# Patient Record
Sex: FEMALE | Race: WHITE | NOT HISPANIC OR LATINO
[De-identification: names, ages, dates, MRNs, and addresses within clinical notes are randomized per-mention and may not be internally consistent; named-entity substitution may affect disease eponyms.]

---

## 2018-12-26 ENCOUNTER — TRANSCRIPTION ENCOUNTER (OUTPATIENT)
Age: 23
End: 2018-12-26

## 2019-06-19 ENCOUNTER — RESULT REVIEW (OUTPATIENT)
Age: 24
End: 2019-06-19

## 2021-08-19 ENCOUNTER — RESULT REVIEW (OUTPATIENT)
Age: 26
End: 2021-08-19

## 2022-09-20 ENCOUNTER — NON-APPOINTMENT (OUTPATIENT)
Age: 27
End: 2022-09-20

## 2022-09-21 ENCOUNTER — EMERGENCY (EMERGENCY)
Facility: HOSPITAL | Age: 27
LOS: 0 days | Discharge: HOME | End: 2022-09-21
Attending: EMERGENCY MEDICINE | Admitting: EMERGENCY MEDICINE

## 2022-09-21 VITALS
DIASTOLIC BLOOD PRESSURE: 55 MMHG | HEART RATE: 77 BPM | TEMPERATURE: 98 F | SYSTOLIC BLOOD PRESSURE: 98 MMHG | HEIGHT: 60 IN | WEIGHT: 117.95 LBS | OXYGEN SATURATION: 99 % | RESPIRATION RATE: 18 BRPM

## 2022-09-21 VITALS — DIASTOLIC BLOOD PRESSURE: 72 MMHG | HEART RATE: 84 BPM | SYSTOLIC BLOOD PRESSURE: 110 MMHG

## 2022-09-21 DIAGNOSIS — Z98.89 OTHER SPECIFIED POSTPROCEDURAL STATES: Chronic | ICD-10-CM

## 2022-09-21 DIAGNOSIS — R19.7 DIARRHEA, UNSPECIFIED: ICD-10-CM

## 2022-09-21 DIAGNOSIS — R10.30 LOWER ABDOMINAL PAIN, UNSPECIFIED: ICD-10-CM

## 2022-09-21 DIAGNOSIS — Z90.89 ACQUIRED ABSENCE OF OTHER ORGANS: ICD-10-CM

## 2022-09-21 LAB
ALBUMIN SERPL ELPH-MCNC: 4.6 G/DL — SIGNIFICANT CHANGE UP (ref 3.5–5.2)
ALP SERPL-CCNC: 71 U/L — SIGNIFICANT CHANGE UP (ref 30–115)
ALT FLD-CCNC: 10 U/L — SIGNIFICANT CHANGE UP (ref 0–41)
ANION GAP SERPL CALC-SCNC: 8 MMOL/L — SIGNIFICANT CHANGE UP (ref 7–14)
APPEARANCE UR: CLEAR — SIGNIFICANT CHANGE UP
AST SERPL-CCNC: 13 U/L — SIGNIFICANT CHANGE UP (ref 0–41)
BACTERIA # UR AUTO: ABNORMAL
BASOPHILS # BLD AUTO: 0.04 K/UL — SIGNIFICANT CHANGE UP (ref 0–0.2)
BASOPHILS NFR BLD AUTO: 0.5 % — SIGNIFICANT CHANGE UP (ref 0–1)
BILIRUB DIRECT SERPL-MCNC: <0.2 MG/DL — SIGNIFICANT CHANGE UP (ref 0–0.3)
BILIRUB INDIRECT FLD-MCNC: >0 MG/DL — LOW (ref 0.2–1.2)
BILIRUB SERPL-MCNC: 0.2 MG/DL — SIGNIFICANT CHANGE UP (ref 0.2–1.2)
BILIRUB UR-MCNC: NEGATIVE — SIGNIFICANT CHANGE UP
BUN SERPL-MCNC: 13 MG/DL — SIGNIFICANT CHANGE UP (ref 10–20)
CALCIUM SERPL-MCNC: 9.3 MG/DL — SIGNIFICANT CHANGE UP (ref 8.4–10.5)
CHLORIDE SERPL-SCNC: 104 MMOL/L — SIGNIFICANT CHANGE UP (ref 98–110)
CO2 SERPL-SCNC: 28 MMOL/L — SIGNIFICANT CHANGE UP (ref 17–32)
COLOR SPEC: YELLOW — SIGNIFICANT CHANGE UP
CREAT SERPL-MCNC: 0.8 MG/DL — SIGNIFICANT CHANGE UP (ref 0.7–1.5)
DIFF PNL FLD: ABNORMAL
EGFR: 104 ML/MIN/1.73M2 — SIGNIFICANT CHANGE UP
EOSINOPHIL # BLD AUTO: 0.15 K/UL — SIGNIFICANT CHANGE UP (ref 0–0.7)
EOSINOPHIL NFR BLD AUTO: 2 % — SIGNIFICANT CHANGE UP (ref 0–8)
EPI CELLS # UR: ABNORMAL /HPF
GLUCOSE SERPL-MCNC: 82 MG/DL — SIGNIFICANT CHANGE UP (ref 70–99)
GLUCOSE UR QL: NEGATIVE MG/DL — SIGNIFICANT CHANGE UP
HCG SERPL QL: NEGATIVE — SIGNIFICANT CHANGE UP
HCT VFR BLD CALC: 35.6 % — LOW (ref 37–47)
HGB BLD-MCNC: 12.2 G/DL — SIGNIFICANT CHANGE UP (ref 12–16)
IMM GRANULOCYTES NFR BLD AUTO: 0.1 % — SIGNIFICANT CHANGE UP (ref 0.1–0.3)
KETONES UR-MCNC: NEGATIVE — SIGNIFICANT CHANGE UP
LACTATE SERPL-SCNC: 0.7 MMOL/L — SIGNIFICANT CHANGE UP (ref 0.7–2)
LEUKOCYTE ESTERASE UR-ACNC: NEGATIVE — SIGNIFICANT CHANGE UP
LIDOCAIN IGE QN: 27 U/L — SIGNIFICANT CHANGE UP (ref 7–60)
LYMPHOCYTES # BLD AUTO: 3.43 K/UL — HIGH (ref 1.2–3.4)
LYMPHOCYTES # BLD AUTO: 45.4 % — SIGNIFICANT CHANGE UP (ref 20.5–51.1)
MCHC RBC-ENTMCNC: 31 PG — SIGNIFICANT CHANGE UP (ref 27–31)
MCHC RBC-ENTMCNC: 34.3 G/DL — SIGNIFICANT CHANGE UP (ref 32–37)
MCV RBC AUTO: 90.4 FL — SIGNIFICANT CHANGE UP (ref 81–99)
MONOCYTES # BLD AUTO: 0.77 K/UL — HIGH (ref 0.1–0.6)
MONOCYTES NFR BLD AUTO: 10.2 % — HIGH (ref 1.7–9.3)
NEUTROPHILS # BLD AUTO: 3.15 K/UL — SIGNIFICANT CHANGE UP (ref 1.4–6.5)
NEUTROPHILS NFR BLD AUTO: 41.8 % — LOW (ref 42.2–75.2)
NITRITE UR-MCNC: NEGATIVE — SIGNIFICANT CHANGE UP
NRBC # BLD: 0 /100 WBCS — SIGNIFICANT CHANGE UP (ref 0–0)
PH UR: 7 — SIGNIFICANT CHANGE UP (ref 5–8)
PLATELET # BLD AUTO: 267 K/UL — SIGNIFICANT CHANGE UP (ref 130–400)
POTASSIUM SERPL-MCNC: 4.1 MMOL/L — SIGNIFICANT CHANGE UP (ref 3.5–5)
POTASSIUM SERPL-SCNC: 4.1 MMOL/L — SIGNIFICANT CHANGE UP (ref 3.5–5)
PROT SERPL-MCNC: 6.8 G/DL — SIGNIFICANT CHANGE UP (ref 6–8)
PROT UR-MCNC: NEGATIVE MG/DL — SIGNIFICANT CHANGE UP
RBC # BLD: 3.94 M/UL — LOW (ref 4.2–5.4)
RBC # FLD: 12 % — SIGNIFICANT CHANGE UP (ref 11.5–14.5)
RBC CASTS # UR COMP ASSIST: ABNORMAL /HPF
SODIUM SERPL-SCNC: 140 MMOL/L — SIGNIFICANT CHANGE UP (ref 135–146)
SP GR SPEC: 1.01 — SIGNIFICANT CHANGE UP (ref 1.01–1.03)
UROBILINOGEN FLD QL: 0.2 MG/DL — SIGNIFICANT CHANGE UP
WBC # BLD: 7.55 K/UL — SIGNIFICANT CHANGE UP (ref 4.8–10.8)
WBC # FLD AUTO: 7.55 K/UL — SIGNIFICANT CHANGE UP (ref 4.8–10.8)
WBC UR QL: SIGNIFICANT CHANGE UP /HPF

## 2022-09-21 PROCEDURE — 99285 EMERGENCY DEPT VISIT HI MDM: CPT

## 2022-09-21 PROCEDURE — 93010 ELECTROCARDIOGRAM REPORT: CPT

## 2022-09-21 PROCEDURE — G1004: CPT

## 2022-09-21 PROCEDURE — 74177 CT ABD & PELVIS W/CONTRAST: CPT | Mod: 26,MG

## 2022-09-21 RX ORDER — SODIUM CHLORIDE 9 MG/ML
1000 INJECTION INTRAMUSCULAR; INTRAVENOUS; SUBCUTANEOUS ONCE
Refills: 0 | Status: COMPLETED | OUTPATIENT
Start: 2022-09-21 | End: 2022-09-21

## 2022-09-21 RX ADMIN — SODIUM CHLORIDE 1000 MILLILITER(S): 9 INJECTION INTRAMUSCULAR; INTRAVENOUS; SUBCUTANEOUS at 16:44

## 2022-09-21 NOTE — ED PROVIDER NOTE - PHYSICAL EXAMINATION
VITAL SIGNS: I have reviewed nursing notes and confirm.  CONSTITUTIONAL: Well-developed; well-nourished; in no acute distress.   SKIN: skin exam is warm and dry, no acute rash.    HEAD: Normocephalic; atraumatic.  EYES: conjunctiva and sclera clear.  ENT: No nasal discharge; airway clear.  NECK: Supple; non tender.  CARD: S1, S2 normal; no murmurs, gallops, or rubs. Regular rate and rhythm.   RESP: No wheezes, rales or rhonchi.  ABD: TTp lower quadrants  Normal bowel sounds; soft; non-distended  EXT: Normal ROM.  No clubbing, cyanosis or edema.   NEURO: Alert, oriented, grossly unremarkable

## 2022-09-21 NOTE — ED PROVIDER NOTE - ATTENDING APP SHARED VISIT CONTRIBUTION OF CARE
27-year-old female presents for evaluation of diarrhea over the last 4 days.  Patient reports lower abdominal cramping and discomfort as well.  Patient with recent trip to evaluate she needed to cut short secondary to symptoms.  No nausea or vomiting.  No one else sick in her party.  No fever or chills, on exam patient in NAD, AAOx3, MMM, abdomen is soft, nondistended, mild tenderness to lower abdomen, no rash

## 2022-09-21 NOTE — ED PROVIDER NOTE - NS ED ROS FT
Eyes:  No visual changes, eye pain or discharge.  ENMT:  No hearing changes, pain, discharge or infections. No neck pain or stiffness.  Cardiac:  No chest pain, SOB or edema. No chest pain with exertion.  Respiratory:  No cough or respiratory distress. No hemoptysis. No history of asthma or RAD.  GI:  + abd pain + diarrhea No nausea, vomiting  :  No dysuria, frequency or burning.  MS:  No myalgia, muscle weakness, joint pain or back pain.  Neuro:  No headache or weakness.  No LOC.  Skin:  No skin rash.   Endocrine: No history of thyroid disease or diabetes.  Except as documented in the HPI,  all other systems are negative.

## 2022-09-21 NOTE — ED PROVIDER NOTE - PATIENT PORTAL LINK FT
You can access the FollowMyHealth Patient Portal offered by [facility] by registering at the following website: [email or web address]. By joining [facility]’s FollowMyHealth portal, you will also be able to view your health information using other applications (apps) compatible with our system.

## 2022-09-21 NOTE — ED ADULT TRIAGE NOTE - CHIEF COMPLAINT QUOTE
"I just got back from [place] yesterday, while I was there I had diarrhea for four days. I went to urgent care today, and they told me to come in to rule out colitis" Denies vomitting and fevers.

## 2022-09-21 NOTE — ED PROVIDER NOTE - CLINICAL SUMMARY MEDICAL DECISION MAKING FREE TEXT BOX
Labs and imaging obtained.  Results reviewed and discussed with patient and significant other.  Patient made aware of incidental findings.  Patient aware of findings from past.  Patient to follow-up with PMD.  Patient to follow bland diet continue p.o. hydration.

## 2022-09-21 NOTE — ED ADULT NURSE NOTE - NSSUHOSCREENINGYN_ED_ALL_ED
History of Present Illness


General


Chief Complaint:  Oral/Throat Problems


Stated Complaint:  LIP SWELLING/POSSIBLE INFECTION/CHEWED ON LIP


Nursing Triage Note:  


C/O SWELLING TO LEFT LOWER LIP SINCE 1800 22 AFTER BITING LIP WHEN NERVOUS.


Source:  patient


Exam Limitations:  no limitations





History of Present Illness


Date Seen by Provider:  2022


Time Seen by Provider:  02:52


Initial Comments


19 yo female presents to the ER with a complaint of swollen left lower lip.  She

states she has been agitated recently and "biting" in the side of her lower lip 

on the left. It has progressively gotten more and more swollen.  She has 

anxiety. does not take anything for it.  No other complaints of illness or 

infection - she was concerned that her lip is infected.  No bleeding.  SHe 

states she has put ice packs on it.





She does appear agitated with some pressured speech - similar to possible 

methamphetamine intoxication.


Timing/Duration:  gradual


Severity:  moderate


Location:  mouth


Prearrival Treatment:  over the counter meds ("pain reliever")


Associated Symptoms:  denies symptoms





Allergies and Home Medications


Allergies


Coded Allergies:  


     sulfamethoxazole (Verified  Allergy, Unknown, 07)


     trimethoprim (Verified  Allergy, Unknown, 07)





Patient Home Medication List


Home Medication List Reviewed:  Yes


Darunavir/Cob/Emtri/Tenof Alaf (Symtuza [phone] mg Tab) 800 Mg-150 Mg-200

Mg-10 Mg Tablet, (Reported)


   Entered as Reported by: RUDY CALZADA on 22


   Last Action: New Order


Dolutegravir Sodium/Lamivudine (Dovato  mg Tablet) 50 Mg-300 Mg Tablet, 

(Reported)


   Entered as Reported by: RUDY CALZADA on 22


   Last Action: New Order


Sertraline HCl (Sertraline HCl) 50 Mg Tablet, (Reported)


   Entered as Reported by: RUDY CALZADA on 22


   Last Action: New Order


Discontinued Medications


Docusate Sodium (Colace) 100 Mg Capsule, 100 MG PO BID


   Discontinued Reason: No Longer Taking


   Prescribed by: SAL MARTINEZ on 19


   Last Action: Discontinued


Ferrous Sulfate (Ferrous Sulfate) 325 Mg Tablet, 325 MG PO DAILY


   Discontinued Reason: No Longer Taking


   Prescribed by: SAL MARTINEZ on 19


   Last Action: Discontinued


Ibuprofen (Ibu) 600 Mg Tablet, 600 MG PO Q6H


   Discontinued Reason: No Longer Taking


   Prescribed by: SAL MARTINEZ on 19


   Last Action: Discontinued


Pnv #14/Ferrous Fum/Folic Acid (Completenate Tablet Chew) 1 Each Tab.chew, 1 

EACH PO DAILY


   Discontinued Reason: No Longer Taking


   Prescribed by: SAL MARTINEZ on 19


   Last Action: Discontinued





Review of Systems


Review of Systems


Constitutional:  see HPI


Eyes:  No Symptoms Reported


Ears:  No Symptoms Reported


Nose:  see HPI


Mouth:  other (swelling left lower lip)


Throat:  no symptoms reported


Respiratory:  no symptoms reported


Cardiovascular:  no symptoms reported


Gastrointestinal:  no symptoms reported


Neurological:  Anxiety





All Other Systems Reviewed


Negative Unless Noted:  Yes





Past Medical-Social-Family Hx


Patient Social History


Tobacco Use?:  No


Substance use?:  No


Alcohol Use?:  No


Pt feels they are or have been:  No





Immunizations Up To Date


PED Vaccines UTD:  Yes





Seasonal Allergies


Seasonal Allergies:  No





Past Medical History


Surgery/Hospitalization HX:  


HIV, ANXIETY/DEPRESSION


Surgeries:  No


Respiratory:  No


Cardiac:  No


Neurological:  No


Reproductive Disorders:  No


Female Reproductive Disorders:  Denies


Genitourinary:  No


Gastrointestinal:  No


Musculoskeletal:  No


Endocrine:  No


HEENT:  No


Cancer:  No


Psychosocial:  No


Integumentary:  No


Blood Disorders:  No


Adverse Reaction/Blood Tranf:  No





Family Medical History





Cardiovascular disease


  19 FATHER (father  from cardiovascular disease. Pt does not know the 

specifics, but will ask her mom in am.)





Physical Exam


Vital Signs





Vital Signs - First Documented








 22





 02:16


 


Temp 36.8


 


Pulse 120


 


Resp 16


 


B/P (MAP) 160/111 (127)


 


Pulse Ox 98


 


O2 Delivery Room Air








Height, Weight, BMI


Height: 5'9.00"


Weight: 210lbs. 2.0oz. 95.142979bk; 20.00 BMI


Method:Stated


General Appearance:  WD/WN, mild distress (anxious)


Eyes:  bilateral eye normal inspection, bilateral eye PERRL, bilateral eye EOMI


Ears:  bilateral ear auricle normal


Nose:  normal inspection


Mouth/Throat:  pharynx normal; No dental tenderness, No excessive drooling; 

other (edema left lower lip - she has avulsed the inner mucosa off of her lip. 

no open wounds; no bleeding. no erythema surrounding the swelling; tender to 

palpation)


Neck:  supple, normal inspection


Cardiovascular:  regular rate, rhythm


Respiratory:  lungs clear, normal breath sounds, no respiratory distress


Neurologic/Psychiatric:  alert, oriented x 3, other (anxious, pressured speech)


Skin:  normal color, warm/dry





Progress/Results/Core Measures


Results/Orders


Vital Signs/I&O











Blood Pressure Mean:                    127











Departure


Impression





   Primary Impression:  


   Lip injury


   Qualified Codes:  S09.93XA - Unspecified injury of face, initial encounter


Disposition:   HOME, SELF-CARE


Condition:  Stable





Departure-Patient Inst.


Decision time for Depature:  03:04


Referrals:  


[facility]


Patient Instructions:  Mouth and Dental Injuries in Adults





Add. Discharge Instructions:  


Continue to use ice packs to the swollen area of your lip for at least 20 

minutes at a time 4-5 times a day.





Try to stop biting on your lip.





Ibuprofen 600 mg which is 3 tablets of either Advil or Motrin every 6 hours with

food for swelling and discomfort.





Follow-up with your primary care doctor.





Return to the emergency department for any new, concerning or emergent 

complaints.





[facility]


[phone]


[address] [place] [address]


Get Immediate Help 


MentalHealth.gov





or Call [phone] -











GILMA DIEGO MD         2022 03:05 Yes - the patient is able to be screened

## 2022-09-21 NOTE — ED ADULT NURSE NOTE - NSICDXPASTSURGICALHX_GEN_ALL_CORE_FT
Pt last seen 9/5/19 with Dr. SOLEDAD Schuler  No fuv    Last RX  Jay Schuler MD   Outpatient Medication Detail      Disp Refills Start End    tiZANidine (ZANAFLEX) 4 MG tablet (Discontinued) 60 tablet 0 7/30/2019 9/19/2019    Sig - Route: Take 1 tablet by mouth every 6 hours as needed (prn). - Oral    Sent to pharmacy as: tiZANidine HCl 4 MG Oral Tablet    Class: Eprescribe    Reason for Discontinue: Therapy Completed         PAST SURGICAL HISTORY:  S/P tonsillectomy

## 2022-09-21 NOTE — ED ADULT NURSE NOTE - CHIEF COMPLAINT QUOTE
"I just got back from [place] yesterday, while I was there I had diarrhea for four days. I went to urgent care today, and they told me to come in to rule out colitis" Denies vomiting and fevers.

## 2022-10-27 ENCOUNTER — RESULT REVIEW (OUTPATIENT)
Age: 27
End: 2022-10-27

## 2023-03-31 ENCOUNTER — ASOB RESULT (OUTPATIENT)
Age: 28
End: 2023-03-31

## 2023-03-31 ENCOUNTER — APPOINTMENT (OUTPATIENT)
Dept: ANTEPARTUM | Facility: CLINIC | Age: 28
End: 2023-03-31
Payer: COMMERCIAL

## 2023-03-31 PROCEDURE — 99202 OFFICE O/P NEW SF 15 MIN: CPT | Mod: 25

## 2023-03-31 PROCEDURE — 76801 OB US < 14 WKS SINGLE FETUS: CPT

## 2023-04-12 ENCOUNTER — APPOINTMENT (OUTPATIENT)
Dept: OBGYN | Facility: CLINIC | Age: 28
End: 2023-04-12
Payer: COMMERCIAL

## 2023-04-12 ENCOUNTER — NON-APPOINTMENT (OUTPATIENT)
Age: 28
End: 2023-04-12

## 2023-04-12 VITALS
DIASTOLIC BLOOD PRESSURE: 72 MMHG | SYSTOLIC BLOOD PRESSURE: 107 MMHG | WEIGHT: 120 LBS | BODY MASS INDEX: 23.56 KG/M2 | HEIGHT: 60 IN

## 2023-04-12 DIAGNOSIS — N92.0 EXCESSIVE AND FREQUENT MENSTRUATION WITH REGULAR CYCLE: ICD-10-CM

## 2023-04-12 DIAGNOSIS — Z31.430 ENCOUNTER OF FEMALE FOR TESTING FOR GENETIC DISEASE CARRIER STATUS FOR PROCREATIVE MANAGEMENT: ICD-10-CM

## 2023-04-12 PROCEDURE — 0500F INITIAL PRENATAL CARE VISIT: CPT

## 2023-04-12 PROCEDURE — 76817 TRANSVAGINAL US OBSTETRIC: CPT

## 2023-04-12 PROCEDURE — 36415 COLL VENOUS BLD VENIPUNCTURE: CPT

## 2023-04-13 LAB
C TRACH RRNA SPEC QL NAA+PROBE: NOT DETECTED
ESTIMATED AVERAGE GLUCOSE: 103 MG/DL
HBA1C MFR BLD HPLC: 5.2 %
N GONORRHOEA RRNA SPEC QL NAA+PROBE: NOT DETECTED
SOURCE AMPLIFICATION: NORMAL

## 2023-04-14 LAB — LEAD BLD-MCNC: <1 UG/DL

## 2023-04-16 LAB
25(OH)D3 SERPL-MCNC: 23.2 NG/ML
MEV IGG FLD QL IA: 15.2 AU/ML
MEV IGG+IGM SER-IMP: NORMAL
MUV AB SER-ACNC: POSITIVE
MUV IGG SER QL IA: 149 AU/ML
RUBV IGG FLD-ACNC: 5.1 INDEX
RUBV IGG SER-IMP: POSITIVE

## 2023-04-16 RX ORDER — AMPICILLIN 500 MG/1
500 CAPSULE ORAL TWICE DAILY
Qty: 14 | Refills: 1 | Status: ACTIVE | COMMUNITY
Start: 2023-04-16 | End: 1900-01-01

## 2023-04-16 RX ORDER — AMPICILLIN 500 MG/1
500 CAPSULE ORAL
Qty: 14 | Refills: 0 | Status: ACTIVE | COMMUNITY
Start: 2023-04-16 | End: 1900-01-01

## 2023-04-18 ENCOUNTER — NON-APPOINTMENT (OUTPATIENT)
Age: 28
End: 2023-04-18

## 2023-04-18 ENCOUNTER — APPOINTMENT (OUTPATIENT)
Dept: OBGYN | Facility: CLINIC | Age: 28
End: 2023-04-18
Payer: COMMERCIAL

## 2023-04-18 VITALS
DIASTOLIC BLOOD PRESSURE: 64 MMHG | SYSTOLIC BLOOD PRESSURE: 100 MMHG | WEIGHT: 122 LBS | BODY MASS INDEX: 23.95 KG/M2 | HEIGHT: 60 IN

## 2023-04-18 PROCEDURE — 0502F SUBSEQUENT PRENATAL CARE: CPT

## 2023-04-21 ENCOUNTER — NON-APPOINTMENT (OUTPATIENT)
Age: 28
End: 2023-04-21

## 2023-04-24 ENCOUNTER — NON-APPOINTMENT (OUTPATIENT)
Age: 28
End: 2023-04-24

## 2023-04-25 ENCOUNTER — NON-APPOINTMENT (OUTPATIENT)
Age: 28
End: 2023-04-25

## 2023-04-26 ENCOUNTER — APPOINTMENT (OUTPATIENT)
Dept: ANTEPARTUM | Facility: CLINIC | Age: 28
End: 2023-04-26

## 2023-04-26 ENCOUNTER — APPOINTMENT (OUTPATIENT)
Dept: OBGYN | Facility: CLINIC | Age: 28
End: 2023-04-26

## 2023-05-01 ENCOUNTER — NON-APPOINTMENT (OUTPATIENT)
Age: 28
End: 2023-05-01

## 2023-05-02 ENCOUNTER — APPOINTMENT (OUTPATIENT)
Dept: OBGYN | Facility: CLINIC | Age: 28
End: 2023-05-02
Payer: COMMERCIAL

## 2023-05-02 ENCOUNTER — TRANSCRIPTION ENCOUNTER (OUTPATIENT)
Age: 28
End: 2023-05-02

## 2023-05-02 ENCOUNTER — ASOB RESULT (OUTPATIENT)
Age: 28
End: 2023-05-02

## 2023-05-02 PROCEDURE — 0502F SUBSEQUENT PRENATAL CARE: CPT

## 2023-05-02 PROCEDURE — 76813 OB US NUCHAL MEAS 1 GEST: CPT

## 2023-05-02 PROCEDURE — 76801 OB US < 14 WKS SINGLE FETUS: CPT

## 2023-05-02 PROCEDURE — 36415 COLL VENOUS BLD VENIPUNCTURE: CPT

## 2023-05-04 LAB — BACTERIA UR CULT: NORMAL

## 2023-05-08 ENCOUNTER — TRANSCRIPTION ENCOUNTER (OUTPATIENT)
Age: 28
End: 2023-05-08

## 2023-05-09 ENCOUNTER — NON-APPOINTMENT (OUTPATIENT)
Age: 28
End: 2023-05-09

## 2023-05-18 ENCOUNTER — APPOINTMENT (OUTPATIENT)
Dept: OBGYN | Facility: CLINIC | Age: 28
End: 2023-05-18
Payer: COMMERCIAL

## 2023-05-18 DIAGNOSIS — R10.30 OTHER SPECIFIED DISEASES AND CONDITIONS COMPLICATING PREGNANCY: ICD-10-CM

## 2023-05-18 DIAGNOSIS — O99.891 OTHER SPECIFIED DISEASES AND CONDITIONS COMPLICATING PREGNANCY: ICD-10-CM

## 2023-05-18 PROCEDURE — 0502F SUBSEQUENT PRENATAL CARE: CPT

## 2023-05-20 LAB — BACTERIA UR CULT: NORMAL

## 2023-05-24 LAB
REPRODUCTIVE CARRIER MULTIGENE ANL BLD/T: NORMAL
TEST PERFORMANCE INFO SPEC: NORMAL

## 2023-05-26 ENCOUNTER — NON-APPOINTMENT (OUTPATIENT)
Age: 28
End: 2023-05-26

## 2023-05-26 ENCOUNTER — TRANSCRIPTION ENCOUNTER (OUTPATIENT)
Age: 28
End: 2023-05-26

## 2023-05-31 ENCOUNTER — APPOINTMENT (OUTPATIENT)
Dept: OBGYN | Facility: CLINIC | Age: 28
End: 2023-05-31
Payer: COMMERCIAL

## 2023-05-31 VITALS
HEART RATE: 83 BPM | DIASTOLIC BLOOD PRESSURE: 72 MMHG | WEIGHT: 128 LBS | HEIGHT: 60 IN | SYSTOLIC BLOOD PRESSURE: 115 MMHG | BODY MASS INDEX: 25.13 KG/M2

## 2023-05-31 DIAGNOSIS — N39.0 URINARY TRACT INFECTION, SITE NOT SPECIFIED: ICD-10-CM

## 2023-05-31 DIAGNOSIS — Z34.91 ENCOUNTER FOR SUPERVISION OF NORMAL PREGNANCY, UNSPECIFIED, FIRST TRIMESTER: ICD-10-CM

## 2023-05-31 DIAGNOSIS — O23.40 UNSPECIFIED INFECTION OF URINARY TRACT IN PREGNANCY, UNSPECIFIED TRIMESTER: ICD-10-CM

## 2023-05-31 PROCEDURE — 0502F SUBSEQUENT PRENATAL CARE: CPT

## 2023-05-31 PROCEDURE — 36415 COLL VENOUS BLD VENIPUNCTURE: CPT

## 2023-06-05 LAB — TSH SERPL-ACNC: 1.77 UIU/ML

## 2023-06-06 ENCOUNTER — NON-APPOINTMENT (OUTPATIENT)
Age: 28
End: 2023-06-06

## 2023-06-08 LAB
AFP MOM: 0.71
AFP VALUE: 27.5 NG/ML
ALPHA FETOPROTEIN SERUM COMMENT: NORMAL
ALPHA FETOPROTEIN SERUM INTERPRETATION: NORMAL
ALPHA FETOPROTEIN SERUM RESULTS: NORMAL
ALPHA FETOPROTEIN SERUM TEST RESULTS: NORMAL
GESTATIONAL AGE BASED ON: NORMAL
GESTATIONAL AGE ON COLLECTION DATE: 16.1 WEEKS
INSULIN DEP DIABETES: NO
MATERNAL AGE AT EDD AFP: 28.6 YR
MULTIPLE GESTATION: NO
OSBR RISK 1 IN: NORMAL
RACE: NORMAL
WEIGHT AFP: 128 LBS

## 2023-06-16 ENCOUNTER — NON-APPOINTMENT (OUTPATIENT)
Age: 28
End: 2023-06-16

## 2023-06-22 ENCOUNTER — APPOINTMENT (OUTPATIENT)
Dept: CARDIOLOGY | Facility: CLINIC | Age: 28
End: 2023-06-22

## 2023-06-26 ENCOUNTER — NON-APPOINTMENT (OUTPATIENT)
Age: 28
End: 2023-06-26

## 2023-06-28 ENCOUNTER — ASOB RESULT (OUTPATIENT)
Age: 28
End: 2023-06-28

## 2023-06-28 ENCOUNTER — APPOINTMENT (OUTPATIENT)
Dept: ANTEPARTUM | Facility: CLINIC | Age: 28
End: 2023-06-28
Payer: COMMERCIAL

## 2023-06-28 PROCEDURE — 76811 OB US DETAILED SNGL FETUS: CPT

## 2023-07-05 ENCOUNTER — APPOINTMENT (OUTPATIENT)
Dept: OBGYN | Facility: CLINIC | Age: 28
End: 2023-07-05
Payer: COMMERCIAL

## 2023-07-05 PROCEDURE — 0502F SUBSEQUENT PRENATAL CARE: CPT

## 2023-07-09 LAB
ABO + RH PNL BLD: NORMAL
BACTERIA UR CULT: NORMAL
BLD GP AB SCN SERPL QL: NORMAL
FERRITIN SERPL-MCNC: 34 NG/ML
HBV SURFACE AG SER QL: NONREACTIVE
HCV AB SER QL: NONREACTIVE
HCV S/CO RATIO: 0.06 S/CO

## 2023-07-17 ENCOUNTER — APPOINTMENT (OUTPATIENT)
Dept: CARDIOLOGY | Facility: CLINIC | Age: 28
End: 2023-07-17
Payer: COMMERCIAL

## 2023-07-17 PROCEDURE — 99203 OFFICE O/P NEW LOW 30 MIN: CPT | Mod: 95

## 2023-07-19 ENCOUNTER — NON-APPOINTMENT (OUTPATIENT)
Age: 28
End: 2023-07-19

## 2023-07-19 ENCOUNTER — APPOINTMENT (OUTPATIENT)
Dept: OBGYN | Facility: CLINIC | Age: 28
End: 2023-07-19
Payer: COMMERCIAL

## 2023-07-19 PROCEDURE — 0502F SUBSEQUENT PRENATAL CARE: CPT

## 2023-08-03 ENCOUNTER — NON-APPOINTMENT (OUTPATIENT)
Age: 28
End: 2023-08-03

## 2023-08-03 ENCOUNTER — APPOINTMENT (OUTPATIENT)
Dept: OBGYN | Facility: CLINIC | Age: 28
End: 2023-08-03
Payer: COMMERCIAL

## 2023-08-03 PROCEDURE — 0502F SUBSEQUENT PRENATAL CARE: CPT

## 2023-08-03 PROCEDURE — 36415 COLL VENOUS BLD VENIPUNCTURE: CPT

## 2023-08-04 LAB
25(OH)D3 SERPL-MCNC: 32.8 NG/ML
BLD GP AB SCN SERPL QL: NORMAL
GLUCOSE 1H P 50 G GLC PO SERPL-MCNC: 90 MG/DL
HCV AB SER QL: NONREACTIVE
HCV S/CO RATIO: 0.07 S/CO
HIV1+2 AB SPEC QL IA.RAPID: NONREACTIVE
T PALLIDUM AB SER QL IA: NEGATIVE

## 2023-08-10 ENCOUNTER — NON-APPOINTMENT (OUTPATIENT)
Age: 28
End: 2023-08-10

## 2023-08-17 ENCOUNTER — APPOINTMENT (OUTPATIENT)
Dept: OBGYN | Facility: CLINIC | Age: 28
End: 2023-08-17
Payer: COMMERCIAL

## 2023-08-17 ENCOUNTER — ASOB RESULT (OUTPATIENT)
Age: 28
End: 2023-08-17

## 2023-08-17 PROCEDURE — 0502F SUBSEQUENT PRENATAL CARE: CPT

## 2023-08-17 PROCEDURE — 76816 OB US FOLLOW-UP PER FETUS: CPT

## 2023-08-20 ENCOUNTER — OUTPATIENT (OUTPATIENT)
Dept: OUTPATIENT SERVICES | Facility: HOSPITAL | Age: 28
LOS: 1 days | End: 2023-08-20
Payer: COMMERCIAL

## 2023-08-20 VITALS — OXYGEN SATURATION: 100 % | HEART RATE: 93 BPM

## 2023-08-20 VITALS — HEART RATE: 98 BPM | OXYGEN SATURATION: 98 %

## 2023-08-20 VITALS — TEMPERATURE: 98 F | DIASTOLIC BLOOD PRESSURE: 57 MMHG | SYSTOLIC BLOOD PRESSURE: 106 MMHG | HEART RATE: 80 BPM

## 2023-08-20 DIAGNOSIS — O26.899 OTHER SPECIFIED PREGNANCY RELATED CONDITIONS, UNSPECIFIED TRIMESTER: ICD-10-CM

## 2023-08-20 DIAGNOSIS — Z98.89 OTHER SPECIFIED POSTPROCEDURAL STATES: Chronic | ICD-10-CM

## 2023-08-20 LAB
APPEARANCE UR: CLEAR — SIGNIFICANT CHANGE UP
BACTERIA # UR AUTO: NEGATIVE — SIGNIFICANT CHANGE UP
BASOPHILS # BLD AUTO: 0.08 K/UL — SIGNIFICANT CHANGE UP (ref 0–0.2)
BASOPHILS NFR BLD AUTO: 0.5 % — SIGNIFICANT CHANGE UP (ref 0–2)
BILIRUB UR-MCNC: NEGATIVE — SIGNIFICANT CHANGE UP
BLD GP AB SCN SERPL QL: NEGATIVE — SIGNIFICANT CHANGE UP
COLOR SPEC: COLORLESS — SIGNIFICANT CHANGE UP
DIFF PNL FLD: NEGATIVE — SIGNIFICANT CHANGE UP
EOSINOPHIL # BLD AUTO: 0.07 K/UL — SIGNIFICANT CHANGE UP (ref 0–0.5)
EOSINOPHIL NFR BLD AUTO: 0.4 % — SIGNIFICANT CHANGE UP (ref 0–6)
EPI CELLS # UR: 2 /HPF — SIGNIFICANT CHANGE UP
GLUCOSE UR QL: NEGATIVE — SIGNIFICANT CHANGE UP
HCT VFR BLD CALC: 33 % — LOW (ref 34.5–45)
HGB BLD-MCNC: 11.1 G/DL — LOW (ref 11.5–15.5)
HYALINE CASTS # UR AUTO: 0 /LPF — SIGNIFICANT CHANGE UP (ref 0–2)
IMM GRANULOCYTES NFR BLD AUTO: 1.6 % — HIGH (ref 0–0.9)
KETONES UR-MCNC: NEGATIVE — SIGNIFICANT CHANGE UP
LEUKOCYTE ESTERASE UR-ACNC: ABNORMAL
LYMPHOCYTES # BLD AUTO: 18.5 % — SIGNIFICANT CHANGE UP (ref 13–44)
LYMPHOCYTES # BLD AUTO: 3.12 K/UL — SIGNIFICANT CHANGE UP (ref 1–3.3)
MCHC RBC-ENTMCNC: 31.5 PG — SIGNIFICANT CHANGE UP (ref 27–34)
MCHC RBC-ENTMCNC: 33.6 GM/DL — SIGNIFICANT CHANGE UP (ref 32–36)
MCV RBC AUTO: 93.8 FL — SIGNIFICANT CHANGE UP (ref 80–100)
MONOCYTES # BLD AUTO: 1.07 K/UL — HIGH (ref 0–0.9)
MONOCYTES NFR BLD AUTO: 6.3 % — SIGNIFICANT CHANGE UP (ref 2–14)
NEUTROPHILS # BLD AUTO: 12.28 K/UL — HIGH (ref 1.8–7.4)
NEUTROPHILS NFR BLD AUTO: 72.7 % — SIGNIFICANT CHANGE UP (ref 43–77)
NITRITE UR-MCNC: NEGATIVE — SIGNIFICANT CHANGE UP
NRBC # BLD: 0 /100 WBCS — SIGNIFICANT CHANGE UP (ref 0–0)
PH UR: 7 — SIGNIFICANT CHANGE UP (ref 5–8)
PLATELET # BLD AUTO: 211 K/UL — SIGNIFICANT CHANGE UP (ref 150–400)
PROT UR-MCNC: NEGATIVE — SIGNIFICANT CHANGE UP
RBC # BLD: 3.52 M/UL — LOW (ref 3.8–5.2)
RBC # FLD: 12.5 % — SIGNIFICANT CHANGE UP (ref 10.3–14.5)
RBC CASTS # UR COMP ASSIST: 4 /HPF — SIGNIFICANT CHANGE UP (ref 0–4)
RH IG SCN BLD-IMP: POSITIVE — SIGNIFICANT CHANGE UP
SP GR SPEC: 1.01 — LOW (ref 1.01–1.02)
UROBILINOGEN FLD QL: NEGATIVE — SIGNIFICANT CHANGE UP
WBC # BLD: 16.89 K/UL — HIGH (ref 3.8–10.5)
WBC # FLD AUTO: 16.89 K/UL — HIGH (ref 3.8–10.5)
WBC UR QL: 1 /HPF — SIGNIFICANT CHANGE UP (ref 0–5)

## 2023-08-20 PROCEDURE — 87086 URINE CULTURE/COLONY COUNT: CPT

## 2023-08-20 PROCEDURE — 59025 FETAL NON-STRESS TEST: CPT | Mod: 26

## 2023-08-20 PROCEDURE — 87653 STREP B DNA AMP PROBE: CPT

## 2023-08-20 PROCEDURE — 87661 TRICHOMONAS VAGINALIS AMPLIF: CPT

## 2023-08-20 PROCEDURE — G0463: CPT

## 2023-08-20 PROCEDURE — 83986 ASSAY PH BODY FLUID NOS: CPT

## 2023-08-20 PROCEDURE — 81001 URINALYSIS AUTO W/SCOPE: CPT

## 2023-08-20 PROCEDURE — 87591 N.GONORRHOEAE DNA AMP PROB: CPT

## 2023-08-20 PROCEDURE — 86900 BLOOD TYPING SEROLOGIC ABO: CPT

## 2023-08-20 PROCEDURE — 86901 BLOOD TYPING SEROLOGIC RH(D): CPT

## 2023-08-20 PROCEDURE — 86850 RBC ANTIBODY SCREEN: CPT

## 2023-08-20 PROCEDURE — 85025 COMPLETE CBC W/AUTO DIFF WBC: CPT

## 2023-08-20 RX ORDER — SODIUM CHLORIDE 9 MG/ML
1000 INJECTION, SOLUTION INTRAVENOUS ONCE
Refills: 0 | Status: COMPLETED | OUTPATIENT
Start: 2023-08-20 | End: 2023-08-20

## 2023-08-20 RX ORDER — NITROFURANTOIN MACROCRYSTAL 50 MG
1 CAPSULE ORAL
Qty: 20 | Refills: 0
Start: 2023-08-20 | End: 2023-08-29

## 2023-08-20 RX ADMIN — SODIUM CHLORIDE 1000 MILLILITER(S): 9 INJECTION, SOLUTION INTRAVENOUS at 05:26

## 2023-08-20 NOTE — OB RN TRIAGE NOTE - NS_OBGYNHISTORY_OBGYN_ALL_OB_FT
Subchorionic Hematoma (resolved) Subchorionic Hematoma (resolved)  SVT last year-- follows cardiac throughout pregnancy

## 2023-08-20 NOTE — OB PROVIDER TRIAGE NOTE - HISTORY OF PRESENT ILLNESS
R3 Triage H&P    DEMARCO WOODALL  [id number]    Subjective  HPI: 29yo F  @27w5d presents for pelvic and rectal pressure. Pt reports that she felt constipated earlier in the evening - she was feeling intense pressure in her lower pelvis and rectum. She was on the toilet for an hour trying to have a bowel movement and when she finally did it was normal and she felt relief - but shortly after she started feeling irregular abdominal tightening. She was at the beach earlier today for multiple hours. Last BM before this was yesterday and was normal.  Also in not sure if she was leaking urine while on the toilet or if it was fluid. Denies any gushes of fluid. Reports good FM, denies VB.   Pt denies any other concerns.    GBS unk  EFW 1072g (43%)  by sono     PNC: Denies prenatal issues.   ObHx: none  GynHx: Denies hx of fibroids, ovarian cysts, abnml PAP smears, STIs  MedHx: palpitations s/p consultation with cardiology - sinus tachycardia. Denies hx of HTN, DM, asthma, thyroid problems, blood clots/bleeding problems, hx of blood transfusions  Meds: PNV, ASA every other day  All: NKDA  PSHx: s/p tonsillectomy  Social: Denies alcohol/tobacco/drug use in pregnancy  Psych: Denies hx of anxiety/depression     – Will accept blood transfusions? Yes      Objective  – VS  T(C): 36.7 (23 @ 04:22)  HR: 94 (23 @ 05:24)  BP: 107/63 (23 @ 04:22)  RR: 18 (23 @ 04:22)  SpO2: 100% (23 @ 05:24)    – PE:   CV: RRR  Pulm: breathing comfortably on RA  Abd: gravid, nontender  Extr: moving all extremities with ease    – Spec: neg pooling, neg nitrazine, no bleeding    – VE: 0/0/-3  – FHT: baseline 135, mod variability, +accels, -decels  – [place]: q3-5 min    – Sono: vertex, BPP 8/8, posterior placenta, PAPA 18

## 2023-08-20 NOTE — OB RN TRIAGE NOTE - NSNURSINGINSTR_OBGYN_ALL_OB_FT
Patient given discharge instruction. patient reports understanding. Patient knows the signs of  labor, leaking of fluid, vaginal bleeding and decreased fetal movement

## 2023-08-20 NOTE — ASSESSMENT
[FreeTextEntry1] : Ms. Sharma is 28 year old woman  that [facility] for a cardiovascular evaluation in the setting of her current pregnancy with complaints of intermittent palpitations.   She carries a istory of symptomatic palpitations in the past and has been diagnosed with SVT. She reports that [facility] with compaint sof palpitaitons. She underwent an echo with reported normal LV function.  +Family history of hypertension and maternal issues with SVT on BB.  #Intermittent palpitatations.   Lionel has undergone extensive monitoring of her palpitaitons  Will contact Dr. Taty Arreguin to obtain records of recent holter   Will obtain recent echocardiogram  Patient o continue on oral Aspirin 81 mg 1 QOD as advised by MFM  Advised aggressive hydration  - Encouraged patient to participate in  healthy walking  and eating habits, focusing on a Mediterranean style of eating.  - Encouraged the patient to find healthy outlets and coping mechanisms to help manage stress, such as reducing workload if possible, spending time with family and friends, engaging in an enjoyable hobby, or using meditation or mindfulness techniques.

## 2023-08-20 NOTE — OB RN TRIAGE NOTE - GRAVIDA, OB PROFILE
1 Transposition Flap Text: The defect edges were debeveled with a #15 scalpel blade.  Given the location of the defect and the proximity to free margins a transposition flap was deemed most appropriate.  Using a sterile surgical marker, an appropriate transposition flap was drawn incorporating the defect.    The area thus outlined was incised deep to adipose tissue with a #15 scalpel blade.  The skin margins were undermined to an appropriate distance in all directions utilizing iris scissors.

## 2023-08-20 NOTE — OB RN TRIAGE NOTE - FALL HARM RISK - UNIVERSAL INTERVENTIONS
Bed in lowest position, wheels locked, appropriate side rails in place/Call bell, personal items and telephone in reach/Instruct patient to call for assistance before getting out of bed or chair/Non-slip footwear when patient is out of bed/[place] to call system/Physically safe environment - no spills, clutter or unnecessary equipment/Purposeful Proactive Rounding/Room/bathroom lighting operational, light cord in reach

## 2023-08-20 NOTE — HISTORY OF PRESENT ILLNESS
[Home] : at home, [unfilled] , at the time of the visit. [Other Location: e.g. Home (Enter Location, City,State)___] : at [unfilled] [Verbal consent obtained from patient] : the patient, [unfilled] [FreeTextEntry1] : Ms. Sharma is 28 year old woman  that [facility] for a cardiovascular evaluation in the setting of her current pregnancy with complaints of intermittent palpitations.   She carries a istory of symptomatic palpitations in the past and has been diagnosed with SVT. She reports that [facility] has [facility] with compaint sof palpitaitons. She underwent an echo with reported normal LV function.  +Family history of hypertension and maternal issues with SVT on BB.  Patient is now 23 weeks pregnant. DUE DATE:    November 14, 2023 LMP:              February 7, 2023  She has been evaluated in the past by a Dr. Taty ESTEVEZ and wore a hotler for one month in which she claims SVT arrhythmia was identifed.   Her blood vital signs were reviewed on Allscripits and all blood pressures were normotensive.   She denies any issues currently with chest pain or sortnes of breath.

## 2023-08-20 NOTE — OB PROVIDER TRIAGE NOTE - NSOBPROVIDERNOTE_OBGYN_ALL_OB_FT
Assessment  28y  yoF   @27w5d  presents for back and pelvic pressure, irregular contractions. Concern for  labor vs  contractions in setting of possible dehydration. Will hydrate with IV fluids, cont to monitor.     Plan  - CBC/T&S  - UA/GC/trich  - 1L bolus LR  - GBS collected  - cont to observe    D/w Dr. Bhupendra Kauffman-Blackwood PGY3

## 2023-08-21 ENCOUNTER — NON-APPOINTMENT (OUTPATIENT)
Age: 28
End: 2023-08-21

## 2023-08-21 VITALS — HEART RATE: 85 BPM | DIASTOLIC BLOOD PRESSURE: 56 MMHG | SYSTOLIC BLOOD PRESSURE: 105 MMHG

## 2023-08-21 LAB
APPEARANCE UR: ABNORMAL
BACTERIA # UR AUTO: ABNORMAL
BILIRUB UR-MCNC: NEGATIVE — SIGNIFICANT CHANGE UP
COLOR SPEC: COLORLESS — SIGNIFICANT CHANGE UP
CULTURE RESULTS: SIGNIFICANT CHANGE UP
DIFF PNL FLD: NEGATIVE — SIGNIFICANT CHANGE UP
EPI CELLS # UR: 12 /HPF — HIGH
GLUCOSE UR QL: NEGATIVE — SIGNIFICANT CHANGE UP
GROUP B BETA STREP DNA (PCR): SIGNIFICANT CHANGE UP
HYALINE CASTS # UR AUTO: 2 /LPF — SIGNIFICANT CHANGE UP (ref 0–2)
KETONES UR-MCNC: NEGATIVE — SIGNIFICANT CHANGE UP
LEUKOCYTE ESTERASE UR-ACNC: ABNORMAL
N GONORRHOEA RRNA SPEC QL NAA+PROBE: SIGNIFICANT CHANGE UP
NITRITE UR-MCNC: NEGATIVE — SIGNIFICANT CHANGE UP
PH UR: 6.5 — SIGNIFICANT CHANGE UP (ref 5–8)
PROT UR-MCNC: SIGNIFICANT CHANGE UP
RBC CASTS # UR COMP ASSIST: 1 /HPF — SIGNIFICANT CHANGE UP (ref 0–4)
SOURCE GROUP B STREP: SIGNIFICANT CHANGE UP
SP GR SPEC: 1.01 — LOW (ref 1.01–1.02)
SPECIMEN SOURCE: SIGNIFICANT CHANGE UP
T VAGINALIS RRNA SPEC QL NAA+PROBE: SIGNIFICANT CHANGE UP
UROBILINOGEN FLD QL: NEGATIVE — SIGNIFICANT CHANGE UP
WBC UR QL: 101 /HPF — HIGH (ref 0–5)

## 2023-08-21 PROCEDURE — 87086 URINE CULTURE/COLONY COUNT: CPT

## 2023-08-21 PROCEDURE — G0463: CPT

## 2023-08-21 PROCEDURE — 59025 FETAL NON-STRESS TEST: CPT

## 2023-08-21 PROCEDURE — 81001 URINALYSIS AUTO W/SCOPE: CPT

## 2023-08-21 NOTE — OB PROVIDER TRIAGE NOTE - HISTORY OF PRESENT ILLNESS
Pt is a 27y/o  at 27+6 presents to triage complaining of abdominal pain and back pain of three hours duration. Patient states that at 9p she had an acute episode of abdominal tightness and back pain. Stated it lasted about 30 minutes so she decided to come to triage for evaluation. In triage, patient states that the abdominal tightness and back pain had resolved. Endorses FM. Denies LOF, VB.     Of note patient was seen in triage earlier in the day for similar complaints. At this time patient's cervix was noted to be long on transvaginal and closed on digital exam. Patient was noted to be penny on the monitor. She received IV fluids--the symptoms subsided and she was subsequently discharged home with return precautions    GBS unk  EFW 1072g (43%)  by patrick     PNC: Denies prenatal issues.   ObHx: none  GynHx: Denies hx of fibroids, ovarian cysts, abnml PAP smears, STIs  MedHx: palpitations s/p consultation with cardiology - sinus tachycardia. Denies hx of HTN, DM, asthma, thyroid problems, blood clots/bleeding problems, hx of blood transfusions  Meds: PNV, ASA every other day  All: NKDA  PSHx: s/p tonsillectomy  Social: Denies alcohol/tobacco/drug use in pregnancy  Psych: Denies hx of anxiety/depression

## 2023-08-21 NOTE — OB PROVIDER TRIAGE NOTE - NSHPPHYSICALEXAM_GEN_ALL_CORE
VS:  Vital Signs Last 24 Hrs  T(C): 36.8 (20 Aug 2023 23:27), Max: 36.8 (20 Aug 2023 23:27)  T(F): 98.2 (20 Aug 2023 23:27), Max: 98.24 (20 Aug 2023 23:27)  HR: 91 (21 Aug 2023 00:01) (73 - 102)  BP: 110/67 (20 Aug 2023 23:27) (106/57 - 110/67)  BP(mean): --  RR: 18 (20 Aug 2023 23:27) (18 - 18)  SpO2: 98% (21 Aug 2023 00:01) (91% - 100%)    Parameters below as of 20 Aug 2023 23:27  Patient On (Oxygen Delivery Method): room air      GA: NAD  Cards: RRR  Pulm: CTAB  EFH: reactive and reassuring  [place]: quiescent  VE: 0/0/-3  TAUS: vertex, MVP: 6.5, +FM  TVUS: CL: 3.5-3.9cm

## 2023-08-21 NOTE — OB PROVIDER TRIAGE NOTE - NSOBPROVIDERNOTE_OBGYN_ALL_OB_FT
Pt is a 29y/o  at 27+6 presents to triage complaining of abdominal pain and back pain of three hours duration. Etiologies include PTL vs mily godinez vs dehydration. Given no contraction on monitor and long cervix, low concern for PTL    -UA  -PO Hydration  -Return precautions given (decreased FM, LOF, VB, Cx)    discussed w Dr Bhupendra Aguilar PGY3

## 2023-08-22 ENCOUNTER — NON-APPOINTMENT (OUTPATIENT)
Age: 28
End: 2023-08-22

## 2023-08-22 ENCOUNTER — APPOINTMENT (OUTPATIENT)
Dept: OBGYN | Facility: CLINIC | Age: 28
End: 2023-08-22
Payer: COMMERCIAL

## 2023-08-22 VITALS
DIASTOLIC BLOOD PRESSURE: 66 MMHG | HEART RATE: 101 BPM | WEIGHT: 151.4 LBS | HEIGHT: 60 IN | BODY MASS INDEX: 29.72 KG/M2 | SYSTOLIC BLOOD PRESSURE: 95 MMHG

## 2023-08-22 DIAGNOSIS — O26.892 OTHER SPECIFIED PREGNANCY RELATED CONDITIONS, SECOND TRIMESTER: ICD-10-CM

## 2023-08-22 DIAGNOSIS — R10.9 UNSPECIFIED ABDOMINAL PAIN: ICD-10-CM

## 2023-08-22 DIAGNOSIS — O99.891 OTHER SPECIFIED DISEASES AND CONDITIONS COMPLICATING PREGNANCY: ICD-10-CM

## 2023-08-22 DIAGNOSIS — O47.02 FALSE LABOR BEFORE 37 COMPLETED WEEKS OF GESTATION, SECOND TRIMESTER: ICD-10-CM

## 2023-08-22 DIAGNOSIS — Z3A.27 27 WEEKS GESTATION OF PREGNANCY: ICD-10-CM

## 2023-08-22 DIAGNOSIS — R00.0 TACHYCARDIA, UNSPECIFIED: ICD-10-CM

## 2023-08-22 DIAGNOSIS — M54.9 DORSALGIA, UNSPECIFIED: ICD-10-CM

## 2023-08-22 DIAGNOSIS — R10.2 PELVIC AND PERINEAL PAIN: ICD-10-CM

## 2023-08-22 LAB
CULTURE RESULTS: SIGNIFICANT CHANGE UP
SPECIMEN SOURCE: SIGNIFICANT CHANGE UP

## 2023-08-22 PROCEDURE — 0502F SUBSEQUENT PRENATAL CARE: CPT

## 2023-08-23 LAB — FIBRONECTIN PLAS-MCNC: NEGATIVE

## 2023-08-24 ENCOUNTER — NON-APPOINTMENT (OUTPATIENT)
Age: 28
End: 2023-08-24

## 2023-08-29 ENCOUNTER — APPOINTMENT (OUTPATIENT)
Dept: OBGYN | Facility: CLINIC | Age: 28
End: 2023-08-29
Payer: COMMERCIAL

## 2023-08-29 PROCEDURE — 0502F SUBSEQUENT PRENATAL CARE: CPT

## 2023-09-01 ENCOUNTER — NON-APPOINTMENT (OUTPATIENT)
Age: 28
End: 2023-09-01

## 2023-09-05 ENCOUNTER — NON-APPOINTMENT (OUTPATIENT)
Age: 28
End: 2023-09-05

## 2023-09-07 ENCOUNTER — APPOINTMENT (OUTPATIENT)
Dept: CARDIOLOGY | Facility: CLINIC | Age: 28
End: 2023-09-07
Payer: COMMERCIAL

## 2023-09-07 VITALS
DIASTOLIC BLOOD PRESSURE: 78 MMHG | SYSTOLIC BLOOD PRESSURE: 112 MMHG | WEIGHT: 151 LBS | HEIGHT: 60 IN | HEART RATE: 123 BPM | BODY MASS INDEX: 29.64 KG/M2 | OXYGEN SATURATION: 99 %

## 2023-09-07 PROCEDURE — 93000 ELECTROCARDIOGRAM COMPLETE: CPT

## 2023-09-07 PROCEDURE — 99214 OFFICE O/P EST MOD 30 MIN: CPT | Mod: 25

## 2023-09-07 NOTE — HISTORY OF PRESENT ILLNESS
[FreeTextEntry1] : Ms. Sharma is 28 year old woman  that presents [facility] for a cardiovascular evaluation in the setting of her current pregnancy with complaints of intermittent palpitations.   She carries a istory of symptomatic palpitations in the past and has been diagnosed with SVT. She reports that [facility] has [facility] with compaint sof palpitaitons. She underwent an echo with reported normal LV function.  +Family history of hypertension and maternal issues with SVT on BB.  Patient is now 23 weeks pregnant. DUE DATE:    November 14, 2023 LMP:              February 7, 2023  She has been evaluated in the past by a Dr. Taty ESTEVEZ and wore a hotler for one month in which she claims SVT arrhythmia was identifed.   Her blood vital signs were reviewed on Allscripits and all blood pressures were normotensive.   She denies any issues currently with chest pain or sortnes of breath.   interval note 9/7/23  has been having ongoing palpitations with episodes of dizziness was seen in the hospital for IVF at times tries to stay on top of keeping herself well hydrated has had an echo and holter in the past

## 2023-09-07 NOTE — DISCUSSION/SUMMARY
[EKG obtained to assist in diagnosis and management of assessed problem(s)] : EKG obtained to assist in diagnosis and management of assessed problem(s) [FreeTextEntry1] : Ms. Sharma is 28 year old woman  that [facility] for a cardiovascular evaluation in the setting of her current pregnancy with complaints of intermittent palpitations.   She carries a istory of symptomatic palpitations in the past and has been diagnosed with SVT. She reports that she has [facility] with compaint sof palpitaitons. She underwent an echo with reported normal LV function.  +Family history of hypertension and maternal issues with SVT on BB.  Patient is now 23 weeks pregnant. DUE DATE:    November 14, 2023 LMP:              February 7, 2023  She has been evaluated in the past by a Dr. Taty ESTEVEZ and wore a hotler for one month in which she claims SVT arrhythmia was identifed.   Her blood vital signs were reviewed on Allscripits and all blood pressures were normotensive.   She denies any issues currently with chest pain or sortnes of breath.   interval note 9/7/23  has been having ongoing palpitations with episodes of dizziness was seen in the hospital for IVF at times tries to stay on top of keeping herself well hydrated has had an echo and holter in the past  #Intermittent palpitatations.   Lionel has undergone extensive monitoring of her palpitaitons recent echo and holter reviewed with pt and her mom no further work up needed at this time  Advised aggressive hydration  - Encouraged patient to participate in  healthy walking  and eating habits, focusing on a Mediterranean style of eating.  - Encouraged the patient to find healthy outlets and coping mechanisms to help manage stress, such as reducing workload if possible, spending time with family and friends, engaging in an enjoyable hobby, or using meditation or mindfulness techniques.

## 2023-09-13 ENCOUNTER — APPOINTMENT (OUTPATIENT)
Dept: OBGYN | Facility: CLINIC | Age: 28
End: 2023-09-13
Payer: COMMERCIAL

## 2023-09-13 ENCOUNTER — ASOB RESULT (OUTPATIENT)
Age: 28
End: 2023-09-13

## 2023-09-13 ENCOUNTER — OUTPATIENT (OUTPATIENT)
Dept: OUTPATIENT SERVICES | Facility: HOSPITAL | Age: 28
LOS: 1 days | End: 2023-09-13
Payer: COMMERCIAL

## 2023-09-13 VITALS — HEART RATE: 104 BPM | OXYGEN SATURATION: 100 %

## 2023-09-13 DIAGNOSIS — Z3A.23 23 WEEKS GESTATION OF PREGNANCY: ICD-10-CM

## 2023-09-13 DIAGNOSIS — Z98.89 OTHER SPECIFIED POSTPROCEDURAL STATES: Chronic | ICD-10-CM

## 2023-09-13 DIAGNOSIS — O26.899 OTHER SPECIFIED PREGNANCY RELATED CONDITIONS, UNSPECIFIED TRIMESTER: ICD-10-CM

## 2023-09-13 LAB
ALBUMIN SERPL ELPH-MCNC: 3.5 G/DL — SIGNIFICANT CHANGE UP (ref 3.3–5)
ALP SERPL-CCNC: 114 U/L — SIGNIFICANT CHANGE UP (ref 40–120)
ALT FLD-CCNC: 25 U/L — SIGNIFICANT CHANGE UP (ref 10–45)
ANION GAP SERPL CALC-SCNC: 11 MMOL/L — SIGNIFICANT CHANGE UP (ref 5–17)
AST SERPL-CCNC: 16 U/L — SIGNIFICANT CHANGE UP (ref 10–40)
BILIRUB SERPL-MCNC: <0.1 MG/DL — LOW (ref 0.2–1.2)
BUN SERPL-MCNC: 11 MG/DL — SIGNIFICANT CHANGE UP (ref 7–23)
CALCIUM SERPL-MCNC: 8.9 MG/DL — SIGNIFICANT CHANGE UP (ref 8.4–10.5)
CHLORIDE SERPL-SCNC: 104 MMOL/L — SIGNIFICANT CHANGE UP (ref 96–108)
CO2 SERPL-SCNC: 22 MMOL/L — SIGNIFICANT CHANGE UP (ref 22–31)
CREAT SERPL-MCNC: 0.85 MG/DL — SIGNIFICANT CHANGE UP (ref 0.5–1.3)
EGFR: 96 ML/MIN/1.73M2 — SIGNIFICANT CHANGE UP
GLUCOSE SERPL-MCNC: 97 MG/DL — SIGNIFICANT CHANGE UP (ref 70–99)
HCT VFR BLD CALC: 32.5 % — LOW (ref 34.5–45)
HGB BLD-MCNC: 10.9 G/DL — LOW (ref 11.5–15.5)
MAGNESIUM SERPL-MCNC: 1.9 MG/DL — SIGNIFICANT CHANGE UP (ref 1.6–2.6)
MCHC RBC-ENTMCNC: 31.3 PG — SIGNIFICANT CHANGE UP (ref 27–34)
MCHC RBC-ENTMCNC: 33.5 GM/DL — SIGNIFICANT CHANGE UP (ref 32–36)
MCV RBC AUTO: 93.4 FL — SIGNIFICANT CHANGE UP (ref 80–100)
NRBC # BLD: 0 /100 WBCS — SIGNIFICANT CHANGE UP (ref 0–0)
PHOSPHATE SERPL-MCNC: 3.2 MG/DL — SIGNIFICANT CHANGE UP (ref 2.5–4.5)
PLATELET # BLD AUTO: 228 K/UL — SIGNIFICANT CHANGE UP (ref 150–400)
POTASSIUM SERPL-MCNC: 4 MMOL/L — SIGNIFICANT CHANGE UP (ref 3.5–5.3)
POTASSIUM SERPL-SCNC: 4 MMOL/L — SIGNIFICANT CHANGE UP (ref 3.5–5.3)
PROT SERPL-MCNC: 5.7 G/DL — LOW (ref 6–8.3)
RBC # BLD: 3.48 M/UL — LOW (ref 3.8–5.2)
RBC # FLD: 12.6 % — SIGNIFICANT CHANGE UP (ref 10.3–14.5)
SODIUM SERPL-SCNC: 137 MMOL/L — SIGNIFICANT CHANGE UP (ref 135–145)
WBC # BLD: 15.83 K/UL — HIGH (ref 3.8–10.5)
WBC # FLD AUTO: 15.83 K/UL — HIGH (ref 3.8–10.5)

## 2023-09-13 PROCEDURE — 76816 OB US FOLLOW-UP PER FETUS: CPT

## 2023-09-13 PROCEDURE — 93010 ELECTROCARDIOGRAM REPORT: CPT

## 2023-09-13 PROCEDURE — 0502F SUBSEQUENT PRENATAL CARE: CPT

## 2023-09-13 PROCEDURE — 76818 FETAL BIOPHYS PROFILE W/NST: CPT

## 2023-09-13 RX ORDER — SODIUM CHLORIDE 9 MG/ML
1000 INJECTION, SOLUTION INTRAVENOUS ONCE
Refills: 0 | Status: COMPLETED | OUTPATIENT
Start: 2023-09-13 | End: 2023-09-14

## 2023-09-13 NOTE — OB PROVIDER TRIAGE NOTE - NSOBPROVIDERNOTE_OBGYN_ALL_OB_FT
27 yo P0 at 31.1w GA presenting to triage for evaluation of palpitations and multiple pre-syncopal episodes. Patient is asymptomatic at the time of OB eval. She is s/p cardio OB eval in first trimester for similar symptoms with Holter monitor events significant for sinus tachycardia and TTE significant for mild pericardial effusion     Plan   - cardio OB consult   - 20 minute NST reactive   - 1L LR bolus   - CBC/CMP/Mg/Phos   - Regular diet   - monitor vitals   - 23 hour obs     d/w Dr. Ramsey Hastings, PGY-3

## 2023-09-13 NOTE — OB RN TRIAGE NOTE - FALL HARM RISK - UNIVERSAL INTERVENTIONS
Bed in lowest position, wheels locked, appropriate side rails in place/Call bell, personal items and telephone in reach/Instruct patient to call for assistance before getting out of bed or chair/Non-slip footwear when patient is out of bed/[place] to call system/Physically safe environment - no spills, clutter or unnecessary equipment/Purposeful Proactive Rounding/Room/bathroom lighting operational, light cord in reach

## 2023-09-13 NOTE — OB PROVIDER TRIAGE NOTE - NSHPPHYSICALEXAM_GEN_ALL_CORE
Objective  – Vital Signs  Vital Signs Last 24 Hrs  T(C): 36.7 (13 Sep 2023 21:37), Max: 36.7 (13 Sep 2023 21:37)  T(F): 98.1 (13 Sep 2023 21:37), Max: 98.1 (13 Sep 2023 21:37)  HR: 95 (13 Sep 2023 23:26) (86 - 121)  BP: 108/67 (13 Sep 2023 21:37) (108/67 - 108/67)  BP(mean): --  RR: 18 (13 Sep 2023 21:37) (18 - 18)  SpO2: 100% (13 Sep 2023 23:26) (91% - 100%)      – PE:   CV: RRR  Pulm: breathing comfortably on RA  Abd: gravid, nontender  Extr: moving all extremities with ease  – FHT: baseline 135, mod variability, +accels, -decels  – [place]: not penny

## 2023-09-14 ENCOUNTER — NON-APPOINTMENT (OUTPATIENT)
Age: 28
End: 2023-09-14

## 2023-09-14 VITALS — HEART RATE: 93 BPM | DIASTOLIC BLOOD PRESSURE: 61 MMHG | TEMPERATURE: 99 F | SYSTOLIC BLOOD PRESSURE: 106 MMHG

## 2023-09-14 DIAGNOSIS — O26.893 OTHER SPECIFIED PREGNANCY RELATED CONDITIONS, THIRD TRIMESTER: ICD-10-CM

## 2023-09-14 DIAGNOSIS — N83.209 UNSPECIFIED OVARIAN CYST, UNSPECIFIED SIDE: ICD-10-CM

## 2023-09-14 DIAGNOSIS — R11.0 NAUSEA: ICD-10-CM

## 2023-09-14 DIAGNOSIS — Z3A.31 31 WEEKS GESTATION OF PREGNANCY: ICD-10-CM

## 2023-09-14 DIAGNOSIS — O34.83 MATERNAL CARE FOR OTHER ABNORMALITIES OF PELVIC ORGANS, THIRD TRIMESTER: ICD-10-CM

## 2023-09-14 DIAGNOSIS — R55 SYNCOPE AND COLLAPSE: ICD-10-CM

## 2023-09-14 DIAGNOSIS — R53.1 WEAKNESS: ICD-10-CM

## 2023-09-14 DIAGNOSIS — R00.0 TACHYCARDIA, UNSPECIFIED: ICD-10-CM

## 2023-09-14 PROCEDURE — 84100 ASSAY OF PHOSPHORUS: CPT

## 2023-09-14 PROCEDURE — 93356 MYOCRD STRAIN IMG SPCKL TRCK: CPT

## 2023-09-14 PROCEDURE — 93306 TTE W/DOPPLER COMPLETE: CPT | Mod: 26

## 2023-09-14 PROCEDURE — 83735 ASSAY OF MAGNESIUM: CPT

## 2023-09-14 PROCEDURE — 93005 ELECTROCARDIOGRAM TRACING: CPT

## 2023-09-14 PROCEDURE — 93356 MYOCRD STRAIN IMG SPCKL TRCK: CPT | Mod: 26

## 2023-09-14 PROCEDURE — 99223 1ST HOSP IP/OBS HIGH 75: CPT

## 2023-09-14 PROCEDURE — G0463: CPT

## 2023-09-14 PROCEDURE — 80053 COMPREHEN METABOLIC PANEL: CPT

## 2023-09-14 PROCEDURE — 85027 COMPLETE CBC AUTOMATED: CPT

## 2023-09-14 PROCEDURE — 96360 HYDRATION IV INFUSION INIT: CPT

## 2023-09-14 PROCEDURE — 93306 TTE W/DOPPLER COMPLETE: CPT

## 2023-09-14 RX ORDER — SODIUM CHLORIDE 9 MG/ML
1000 INJECTION, SOLUTION INTRAVENOUS ONCE
Refills: 0 | Status: DISCONTINUED | OUTPATIENT
Start: 2023-09-14 | End: 2023-09-28

## 2023-09-14 RX ORDER — SODIUM CHLORIDE 9 MG/ML
1000 INJECTION, SOLUTION INTRAVENOUS ONCE
Refills: 0 | Status: COMPLETED | OUTPATIENT
Start: 2023-09-14 | End: 2023-09-14

## 2023-09-14 RX ADMIN — SODIUM CHLORIDE 1000 MILLILITER(S): 9 INJECTION, SOLUTION INTRAVENOUS at 00:20

## 2023-09-14 RX ADMIN — SODIUM CHLORIDE 1000 MILLILITER(S): 9 INJECTION, SOLUTION INTRAVENOUS at 14:50

## 2023-09-14 NOTE — CONSULT NOTE ADULT - ASSESSMENT
Called and spoke with patient. Pt stated fell Saturday on water and fell on \"tail end\". Stated bent knee approximately 120 degree. Stated knee was stiff and took Percocet. Able to Ambulate on Sunday and is able to ambulate up and down the stairs today. Stated knee joint does not hurt. C/O Calf and Quad pain. Stated feels like \"cramp\". Stated had swelling Sat and Sunday. \"Hard to tell\" if has swelling in the knee today.   Denies redness or pain in knee joint.   Scheduled appointment with BETO Cantu for today. Pt agreeable.    28yoF  at 31w1d gestational age presents for evaluation of palpitations.     #Palpitations  -One episode of syncope two weeks ago, had prodromal symptoms. her HR is elevated >120 during palpitations.   - EKG: sinus rhythm with normal axis, iRBBB, similar to 2022  -Obtain TTE  -Tele  -Check lipids, a1c, TSH/FT4  -will likely need ziopatch prior to discharge    Luis Celestin, Cardiology Fellow, F-2    For all New Consults and Questions:  [email or web address]   Login: cardfellows    *** Note not final until signed by attending

## 2023-09-14 NOTE — CHART NOTE - NSCHARTNOTEFT_GEN_A_CORE
Patient seen and evaluated at bedside  Currently declines palpitations.  Evaluated by cardiology today - appreciate recs: EKG notable for RBBB, echo wnl. Pt to receive additional IV bolus and can be discharged home w/ Holter monitor.     PLAN  - 1L bolus p  - NST prior to discharge  - Holter monitor  - d/c home with OB and cardio follow up    SANTOS Kauffman-Merced PGY3

## 2023-09-14 NOTE — CONSULT NOTE ADULT - ATTENDING COMMENTS
pt is well known to me  - currently feels much better after the IV fluid  - the TTE was reviewed with her and her mother - pt has a preserved LV function with no significant valvular disease - the IVC appears to be small and collapsable - significant for being underfilled  - on telemetry with sinus/sinus tachycarda  - will place a cardiopatch - ZioAT for about 7-10 days  - spoke with the OBGYN resident to give more IV fluid prior to sending the pt home

## 2023-09-14 NOTE — CONSULT NOTE ADULT - SUBJECTIVE AND OBJECTIVE BOX
Patient seen and evaluated at bedside    Chief Complaint: palpitations.     HPI:  Admission H&P    Subjective  HPI: 28yoF  at 31w1d gestational age presents for evaluation of palpitations. Patient reports that starting at the beginning of pregnancy, she began having intermittent palpitations that have increased in frequency, particularly over the last two weeks. 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 SHe had palpitations yesterday, was seen by her OB who sent her to hospital for further evaluation.  States that she had a syncopal episode while at her cousin's rehearsal dinner last week. She reports that prior to the syncopal event, she felt palpitations, nauseas, light headeded, pale and then passed out for a few seconds, and was neurologically intact immediately after the episode  She was in her usual state of health when she suddenly started feeling palpitations, nauseous and weak. She had previously been evaluated by a cardiologist at the beginning of pregnancy where a holter revealed an ?SVT, and had an echo which she reports showed a small pericardial effusion She reports a family history of "SVT", but no family history of sudden syncope or sudden cardiac death. She drinks 1/4 cup of coffee per day and intermittent chocolate.       – OBHx: P0  – GynHx: Remote hx of ovarian cysts, not requiring surgical management   – PMH: None  -Fam Hx : "SVT"  – PSH: tonsillectomy    – Psych: denies   – Social: denies   – Meds: PNV, Vit D    – Allergies: NKDA         (13 Sep 2023 22:48)      PMHx:   No pertinent past medical history    Tonsillitis    Heart palpitations        PSHx:   S/P tonsillectomy        Allergies:  IV Contrast (Hives; Rash)          FAMILY HISTORY:  "SVT"    Social History:  Smoking History:denies  Alcohol Use:denies  Drug Use:denies    REVIEW OF SYSTEMS:  Constitutional:     [ ] negative [ ] fevers [ ] chills [ ] weight loss [ ] weight gain  HEENT:                  [ ] negative [ ] dry eyes [ ] eye irritation [ ] postnasal drip [ ] nasal congestion  CV:                         [ ] negative  [ ] chest pain [ ] orthopnea [ ] palpitations [ ] murmur  Resp:                     [ ] negative [ ] cough [ ] shortness of breath [ ] dyspnea [ ] wheezing [ ] sputum [ ]hemoptysis  GI:                          [ ] negative [ ] nausea [ ] vomiting [ ] diarrhea [ ] constipation [ ] abd pain [ ] dysphagia   :                        [ ] negative [ ] dysuria [ ] nocturia [ ] hematuria [ ] increased urinary frequency  Musculoskeletal: [ ] negative [ ] back pain [ ] myalgias [ ] arthralgias [ ] fracture  Skin:                       [ ] negative [ ] rash [ ] itch  Neurological:        [ ] negative [ ] headache [ ] dizziness [ ] syncope [ ] weakness [ ] numbness  Psychiatric:           [ ] negative [ ] anxiety [ ] depression  Endocrine:            [ ] negative [ ] diabetes [ ] thyroid problem  Heme/Lymph:      [ ] negative [ ] anemia [ ] bleeding problem  Allergic/Immune: [ ] negative [ ] itchy eyes [ ] nasal discharge [ ] hives [ ] angioedema    [ ] All other systems negative  [ ] Unable to assess ROS due to      Physical Exam:  T(F): 97.88 (), Max: 98.1 ()  HR: 91 () (78 - 121)  BP: 103/59 () (103/59 - 108/67)  RR: 16 ()  SpO2: 100% ()  GENERAL: No acute distress, well-developed  HEAD:  Atraumatic, Normocephalic  ENT: EOMI, PERRLA, conjunctiva and sclera clear, Neck supple, No JVD, moist mucosa  CHEST/LUNG: Clear to auscultation bilaterally; No wheeze, equal breath sounds bilaterally   BACK: No spinal tenderness  HEART: Regular rate and rhythm; No murmurs, rubs, or gallops  ABDOMEN: Soft, Nontender, Nondistended; Bowel sounds present  EXTREMITIES:  No clubbing, cyanosis, or edema  PSYCH: Nl behavior, nl affect  NEUROLOGY: AAOx3, non-focal, cranial nerves intact  SKIN: Normal color, No rashes or lesions  LINES:    Cardiovascular Diagnostic Testing:    ECG: Personally reviewed:    Echo: Personally reviewed:    Stress Testing:    Cath:    Imaging:    CXR: Personally reviewed    Labs: Personally reviewed                        10.9   1583 )-----------( 228      ( 13 Sep 2023 23:15 )             32.5     09-13    137  |  104  |  11  ----------------------------<  97  4.0   |  22  |  0.85    Ca    8.9      13 Sep 2023 23:15  Phos  3.2       Mg     1.9         TPro  5.7<L>  /  Alb  3.5  /  TBili  <0.1<L>  /  DBili  x   /  AST  16  /  ALT  25  /  AlkPhos  114               Patient seen and evaluated at bedside    Chief Complaint: palpitations.     HPI:  Admission H&P    Subjective  HPI: 28yoF  at 31w1d gestational age presents for evaluation of palpitations. Patient reports that starting at the beginning of pregnancy, she began having intermittent palpitations that have increased in frequency, particularly over the last two weeks. 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 SHe had palpitations yesterday, was seen by her OB who sent her to hospital for further evaluation.  States that she had a syncopal episode while at her cousin's rehearsal dinner last week. She reports that prior to the syncopal event, she felt palpitations, nauseas, light headeded, pale and then passed out for a few seconds, and was neurologically intact immediately after the episode  She was in her usual state of health when she suddenly started feeling palpitations, nauseous and weak. She had previously been evaluated by a cardiologist at the beginning of pregnancy where a holter revealed an ?SVT, and had an echo which she reports showed a small pericardial effusion She reports a family history of "SVT", but no family history of sudden syncope or sudden cardiac death. She drinks 1/4 cup of coffee per day and intermittent chocolate.       – OBHx: P0  – GynHx: Remote hx of ovarian cysts, not requiring surgical management   – PMH: None  -Fam Hx : "SVT"  – PSH: tonsillectomy    – Psych: denies   – Social: denies   – Meds: PNV, Vit D    – Allergies: NKDA         (13 Sep 2023 22:48)      PMHx:   No pertinent past medical history    Tonsillitis    Heart palpitations        PSHx:   S/P tonsillectomy        Allergies:  IV Contrast (Hives; Rash)          FAMILY HISTORY:  "SVT"    Social History:  Smoking History:denies  Alcohol Use:denies  Drug Use:denies    REVIEW OF SYSTEMS:  Constitutional:     [x ] negative [ ] fevers [ ] chills [ ] weight loss [ ] weight gain  HEENT:                  [x ] negative [ ] dry eyes [ ] eye irritation [ ] postnasal drip [ ] nasal congestion  CV:                         [ ] negative  [ ] chest pain [ ] orthopnea [ x] palpitations [ ] murmur  Resp:                     [ x] negative [ ] cough [ ] shortness of breath [ ] dyspnea [ ] wheezing [ ] sputum [ ]hemoptysis  GI:                          [x ] negative [ ] nausea [ ] vomiting [ ] diarrhea [ ] constipation [ ] abd pain [ ] dysphagia   :                        [ x] negative [ ] dysuria [ ] nocturia [ ] hematuria [ ] increased urinary frequency  Musculoskeletal: [x ] negative [ ] back pain [ ] myalgias [ ] arthralgias [ ] fracture  Skin:                       [x ] negative [ ] rash [ ] itch  Neurological:        [x ] negative [ ] headache [ ] dizziness [ ] syncope [ ] weakness [ ] numbness  Psychiatric:           [x ] negative [ ] anxiety [ ] depression  Endocrine:            [x ] negative [ ] diabetes [ ] thyroid problem  Heme/Lymph:      [ x] negative [ ] anemia [ ] bleeding problem  Allergic/Immune: [x ] negative [ ] itchy eyes [ ] nasal discharge [ ] hives [ ] angioedema    [ x] All other systems negative        Physical Exam:  T(F): 97.88 (), Max: 98.1 ()  HR: 91 () (78 - 121)  BP: 103/59 () (103/59 - 108/67)  RR: 16 ()  SpO2: 100% ()  GENERAL: No acute distress, well-developed  HEAD:  Atraumatic, Normocephalic  ENT: EOMI, PERRLA, conjunctiva and sclera clear, Neck supple, No JVD, moist mucosa  CHEST/LUNG: Clear to auscultation bilaterally; No wheeze, equal breath sounds bilaterally   BACK: No spinal tenderness  HEART: Regular rate and rhythm; No murmurs, rubs, or gallops  ABDOMEN: Soft, Nontender, Nondistended; Bowel sounds present  EXTREMITIES:  No clubbing, cyanosis, or edema  PSYCH: Nl behavior, nl affect  NEUROLOGY: AAOx3, non-focal, cranial nerves intact  SKIN: Normal color, No rashes or lesions    Cardiovascular Diagnostic Testing:    ECG: Personally reviewed: sinus rhythm with normal axis, iRBBB, similar to 2022      CXR: Personally reviewed    Labs: Personally reviewed                        10.   15.83 )-----------( 228      ( 13 Sep 2023 23:15 )             32.5     09-13    137  |  104  |  11  ----------------------------<  97  4.0   |  22  |  0.85    Ca    8.9      13 Sep 2023 23:15  Phos  3.2       Mg     1.9         TPro  5.7<L>  /  Alb  3.5  /  TBili  <0.1<L>  /  DBili  x   /  AST  16  /  ALT  25  /  AlkPhos  114

## 2023-09-18 ENCOUNTER — NON-APPOINTMENT (OUTPATIENT)
Age: 28
End: 2023-09-18

## 2023-09-19 ENCOUNTER — APPOINTMENT (OUTPATIENT)
Dept: OBGYN | Facility: CLINIC | Age: 28
End: 2023-09-19
Payer: COMMERCIAL

## 2023-09-19 ENCOUNTER — ASOB RESULT (OUTPATIENT)
Age: 28
End: 2023-09-19

## 2023-09-19 PROBLEM — R00.2 PALPITATIONS: Chronic | Status: ACTIVE | Noted: 2023-09-13

## 2023-09-19 PROCEDURE — 0502F SUBSEQUENT PRENATAL CARE: CPT

## 2023-09-19 PROCEDURE — 76819 FETAL BIOPHYS PROFIL W/O NST: CPT

## 2023-09-26 ENCOUNTER — APPOINTMENT (OUTPATIENT)
Dept: OBGYN | Facility: CLINIC | Age: 28
End: 2023-09-26

## 2023-10-02 ENCOUNTER — NON-APPOINTMENT (OUTPATIENT)
Age: 28
End: 2023-10-02

## 2023-10-03 ENCOUNTER — APPOINTMENT (OUTPATIENT)
Dept: OBGYN | Facility: CLINIC | Age: 28
End: 2023-10-03
Payer: COMMERCIAL

## 2023-10-03 ENCOUNTER — ASOB RESULT (OUTPATIENT)
Age: 28
End: 2023-10-03

## 2023-10-03 PROCEDURE — 76819 FETAL BIOPHYS PROFIL W/O NST: CPT

## 2023-10-03 PROCEDURE — 0502F SUBSEQUENT PRENATAL CARE: CPT

## 2023-10-05 ENCOUNTER — NON-APPOINTMENT (OUTPATIENT)
Age: 28
End: 2023-10-05

## 2023-10-06 ENCOUNTER — APPOINTMENT (OUTPATIENT)
Dept: OBGYN | Facility: CLINIC | Age: 28
End: 2023-10-06
Payer: COMMERCIAL

## 2023-10-06 PROCEDURE — 59025 FETAL NON-STRESS TEST: CPT

## 2023-10-06 PROCEDURE — 0502F SUBSEQUENT PRENATAL CARE: CPT

## 2023-10-08 LAB — BACTERIA UR CULT: NORMAL

## 2023-10-12 ENCOUNTER — OUTPATIENT (OUTPATIENT)
Dept: OUTPATIENT SERVICES | Facility: HOSPITAL | Age: 28
LOS: 1 days | End: 2023-10-12
Payer: COMMERCIAL

## 2023-10-12 VITALS — OXYGEN SATURATION: 97 % | HEART RATE: 93 BPM

## 2023-10-12 DIAGNOSIS — O26.899 OTHER SPECIFIED PREGNANCY RELATED CONDITIONS, UNSPECIFIED TRIMESTER: ICD-10-CM

## 2023-10-12 DIAGNOSIS — Z98.89 OTHER SPECIFIED POSTPROCEDURAL STATES: Chronic | ICD-10-CM

## 2023-10-12 LAB
APPEARANCE UR: ABNORMAL
APPEARANCE UR: ABNORMAL
BACTERIA # UR AUTO: ABNORMAL
BACTERIA # UR AUTO: ABNORMAL
BILIRUB UR-MCNC: NEGATIVE — SIGNIFICANT CHANGE UP
BILIRUB UR-MCNC: NEGATIVE — SIGNIFICANT CHANGE UP
COLOR SPEC: SIGNIFICANT CHANGE UP
COLOR SPEC: SIGNIFICANT CHANGE UP
DIFF PNL FLD: NEGATIVE — SIGNIFICANT CHANGE UP
DIFF PNL FLD: NEGATIVE — SIGNIFICANT CHANGE UP
EPI CELLS # UR: 9 /HPF — HIGH
EPI CELLS # UR: 9 /HPF — HIGH
GLUCOSE UR QL: NEGATIVE — SIGNIFICANT CHANGE UP
GLUCOSE UR QL: NEGATIVE — SIGNIFICANT CHANGE UP
HYALINE CASTS # UR AUTO: 1 /LPF — SIGNIFICANT CHANGE UP (ref 0–2)
HYALINE CASTS # UR AUTO: 1 /LPF — SIGNIFICANT CHANGE UP (ref 0–2)
KETONES UR-MCNC: NEGATIVE — SIGNIFICANT CHANGE UP
KETONES UR-MCNC: NEGATIVE — SIGNIFICANT CHANGE UP
LEUKOCYTE ESTERASE UR-ACNC: ABNORMAL
LEUKOCYTE ESTERASE UR-ACNC: ABNORMAL
NITRITE UR-MCNC: NEGATIVE — SIGNIFICANT CHANGE UP
NITRITE UR-MCNC: NEGATIVE — SIGNIFICANT CHANGE UP
PH UR: 7 — SIGNIFICANT CHANGE UP (ref 5–8)
PH UR: 7 — SIGNIFICANT CHANGE UP (ref 5–8)
PROT UR-MCNC: NEGATIVE — SIGNIFICANT CHANGE UP
PROT UR-MCNC: NEGATIVE — SIGNIFICANT CHANGE UP
RBC CASTS # UR COMP ASSIST: 2 /HPF — SIGNIFICANT CHANGE UP (ref 0–4)
RBC CASTS # UR COMP ASSIST: 2 /HPF — SIGNIFICANT CHANGE UP (ref 0–4)
SP GR SPEC: 1.01 — LOW (ref 1.01–1.02)
SP GR SPEC: 1.01 — LOW (ref 1.01–1.02)
UROBILINOGEN FLD QL: NEGATIVE — SIGNIFICANT CHANGE UP
UROBILINOGEN FLD QL: NEGATIVE — SIGNIFICANT CHANGE UP
WBC UR QL: 11 /HPF — HIGH (ref 0–5)
WBC UR QL: 11 /HPF — HIGH (ref 0–5)

## 2023-10-12 PROCEDURE — 81001 URINALYSIS AUTO W/SCOPE: CPT

## 2023-10-12 PROCEDURE — 99213 OFFICE O/P EST LOW 20 MIN: CPT

## 2023-10-12 PROCEDURE — 87086 URINE CULTURE/COLONY COUNT: CPT

## 2023-10-12 PROCEDURE — G0463: CPT

## 2023-10-12 PROCEDURE — 96360 HYDRATION IV INFUSION INIT: CPT

## 2023-10-12 RX ORDER — SODIUM CHLORIDE 9 MG/ML
1000 INJECTION, SOLUTION INTRAVENOUS
Refills: 0 | Status: DISCONTINUED | OUTPATIENT
Start: 2023-10-12 | End: 2023-10-26

## 2023-10-12 RX ORDER — SODIUM CHLORIDE 9 MG/ML
500 INJECTION, SOLUTION INTRAVENOUS ONCE
Refills: 0 | Status: COMPLETED | OUTPATIENT
Start: 2023-10-12 | End: 2023-10-12

## 2023-10-12 RX ADMIN — SODIUM CHLORIDE 500 MILLILITER(S): 9 INJECTION, SOLUTION INTRAVENOUS at 12:20

## 2023-10-12 NOTE — OB PROVIDER TRIAGE NOTE - HISTORY OF PRESENT ILLNESS
GBS: Unknown    EFW:   OB: Primigravida   GYN: Denies history of fibroids, abnormal pap smears, STDs, or ovarian cysts   Allergies: IV Contrast (urticaria)   Medical Hx: Denies history of HTN, DM, bleeding disorders, thyroid disorders   Medications: Denies  Surgical Hx: Denies   Social Hx: Denies x3, no anxiety or depression  29yo  @35 weeks and 3 days presents with abdominal cramping x 1 week. Patient admits to good fetal movement, and denies vaginal bleeding or loss of fluid at this time.    GBS: Unknown    EFW:   OB: Primigravida   GYN: Denies history of fibroids, abnormal pap smears, STDs, or ovarian cysts   Allergies: IV Contrast (urticaria)   Medical Hx: Denies history of HTN, DM, bleeding disorders, thyroid disorders   Medications: Prenatal vitamins   Surgical Hx: Tonsillectomy ()  Social Hx: Denies x3, no anxiety or depression  27yo  @35 weeks and 3 days (YARI:) presents with abdominal cramping x 1 week. Patient admits to good fetal movement, and denies vaginal bleeding or loss of fluid at this time.    GBS: Unknown    EFW:   OB: Primigravida   GYN: Denies history of fibroids, abnormal pap smears, STDs, or ovarian cysts   Allergies: IV Contrast (urticaria)   Medical Hx: Denies history of HTN, DM, bleeding disorders, thyroid disorders   Medications: Prenatal vitamins   Surgical Hx: Tonsillectomy ()  Social Hx: Denies x3, no anxiety or depression  27yo  @35 weeks and 2 days (YARI:) presents with abdominal cramping x 1 week. Patient admits to good fetal movement, and denies vaginal bleeding or loss of fluid at this time.    GBS: Unknown    EFW: [id number]  OB: Primigravida   GYN: Denies history of fibroids, abnormal pap smears, STDs, or ovarian cysts   Allergies: IV Contrast (urticaria)   Medical Hx: Denies history of HTN, DM, bleeding disorders, thyroid disorders   Medications: Prenatal vitamins   Surgical Hx: Tonsillectomy ()  Social Hx: Denies x3, no anxiety or depression

## 2023-10-12 NOTE — OB PROVIDER TRIAGE NOTE - NSHPPHYSICALEXAM_GEN_ALL_CORE
CV: S1,S2  Pulmonary: Clear to auscultation bilaterally    Abdomen: Gravid abdomen  Extremities: Nontender to palpation bilaterally     EFM: 130hr moderate variability, +accelerations, -decelerations   TOCO: Irritability   SVE: closed

## 2023-10-12 NOTE — OB PROVIDER TRIAGE NOTE - NSOBPROVIDERNOTE_OBGYN_ALL_OB_FT
A/P: 27yo  @35 weeks and 3 days (YARI:) presents to triage with abdominal cramping x 1 week.   Plan:  -IV Hydration   -Repeat VE at 2pm  -Continue to monitor EFM/TOCO  -UA ordered  -Discussed with Dr. Huizar A/P: 29yo  @35 weeks and 3 days (YARI:) presents to triage with abdominal cramping x 1 week.   Plan:  -IV Hydration   -Repeat VE at 2pm is still closed   -Continue to monitor EFM/TOCO  -UA negative   -Patient safe A/P: 27yo  @35 weeks and 3 days (YARI:) presents to triage with abdominal cramping x 1 week.   Plan:  -IV Hydration   -Repeat VE at 2pm is still closed   -Continue to monitor EFM/TOCO  -UA positive, Augmention 875mg BIDx10 days, Ucx sent   -Patient safe to be d/c home and educated to follow up in the office   -Discussed with Dr. Huizar

## 2023-10-12 NOTE — OB RN TRIAGE NOTE - FALL HARM RISK - UNIVERSAL INTERVENTIONS
Bed in lowest position, wheels locked, appropriate side rails in place/Call bell, personal items and telephone in reach/Instruct patient to call for assistance before getting out of bed or chair/Non-slip footwear when patient is out of bed/[place] to call system/Physically safe environment - no spills, clutter or unnecessary equipment/Purposeful Proactive Rounding/Room/bathroom lighting operational, light cord in reach

## 2023-10-13 ENCOUNTER — NON-APPOINTMENT (OUTPATIENT)
Age: 28
End: 2023-10-13

## 2023-10-13 ENCOUNTER — APPOINTMENT (OUTPATIENT)
Dept: CV DIAGNOSITCS | Facility: HOSPITAL | Age: 28
End: 2023-10-13

## 2023-10-13 ENCOUNTER — APPOINTMENT (OUTPATIENT)
Dept: CARDIOLOGY | Facility: CLINIC | Age: 28
End: 2023-10-13

## 2023-10-13 DIAGNOSIS — Z3A.35 35 WEEKS GESTATION OF PREGNANCY: ICD-10-CM

## 2023-10-13 DIAGNOSIS — O26.893 OTHER SPECIFIED PREGNANCY RELATED CONDITIONS, THIRD TRIMESTER: ICD-10-CM

## 2023-10-13 DIAGNOSIS — R10.9 UNSPECIFIED ABDOMINAL PAIN: ICD-10-CM

## 2023-10-14 LAB
CULTURE RESULTS: SIGNIFICANT CHANGE UP
SPECIMEN SOURCE: SIGNIFICANT CHANGE UP

## 2023-10-16 ENCOUNTER — NON-APPOINTMENT (OUTPATIENT)
Age: 28
End: 2023-10-16

## 2023-10-16 ENCOUNTER — APPOINTMENT (OUTPATIENT)
Dept: OBGYN | Facility: CLINIC | Age: 28
End: 2023-10-16

## 2023-10-18 ENCOUNTER — NON-APPOINTMENT (OUTPATIENT)
Age: 28
End: 2023-10-18

## 2023-10-18 ENCOUNTER — APPOINTMENT (OUTPATIENT)
Dept: OBGYN | Facility: CLINIC | Age: 28
End: 2023-10-18
Payer: COMMERCIAL

## 2023-10-18 ENCOUNTER — ASOB RESULT (OUTPATIENT)
Age: 28
End: 2023-10-18

## 2023-10-18 DIAGNOSIS — Z36.85 ENCOUNTER FOR ANTENATAL SCREENING FOR STREPTOCOCCUS B: ICD-10-CM

## 2023-10-18 PROCEDURE — 0502F SUBSEQUENT PRENATAL CARE: CPT

## 2023-10-18 PROCEDURE — 76819 FETAL BIOPHYS PROFIL W/O NST: CPT | Mod: 59

## 2023-10-18 PROCEDURE — 76816 OB US FOLLOW-UP PER FETUS: CPT

## 2023-10-20 LAB
GP B STREP DNA SPEC QL NAA+PROBE: NOT DETECTED
SOURCE GBS: NORMAL

## 2023-10-24 ENCOUNTER — NON-APPOINTMENT (OUTPATIENT)
Age: 28
End: 2023-10-24

## 2023-10-25 ENCOUNTER — NON-APPOINTMENT (OUTPATIENT)
Age: 28
End: 2023-10-25

## 2023-10-25 ENCOUNTER — APPOINTMENT (OUTPATIENT)
Dept: OBGYN | Facility: CLINIC | Age: 28
End: 2023-10-25
Payer: COMMERCIAL

## 2023-10-25 ENCOUNTER — ASOB RESULT (OUTPATIENT)
Age: 28
End: 2023-10-25

## 2023-10-25 DIAGNOSIS — Z34.92 ENCOUNTER FOR SUPERVISION OF NORMAL PREGNANCY, UNSPECIFIED, SECOND TRIMESTER: ICD-10-CM

## 2023-10-25 PROCEDURE — 0502F SUBSEQUENT PRENATAL CARE: CPT

## 2023-10-25 PROCEDURE — 76818 FETAL BIOPHYS PROFILE W/NST: CPT

## 2023-10-25 RX ORDER — 70%ISOPROPYL ALCOHOL 0.7 ML/ML
70 SWAB TOPICAL
Qty: 1 | Refills: 0 | Status: ACTIVE | COMMUNITY
Start: 2023-10-25 | End: 1900-01-01

## 2023-10-25 RX ORDER — BLOOD-GLUCOSE METER
W/DEVICE KIT MISCELLANEOUS
Qty: 1 | Refills: 0 | Status: ACTIVE | COMMUNITY
Start: 2023-10-25 | End: 1900-01-01

## 2023-10-25 RX ORDER — LANCETS
EACH MISCELLANEOUS
Qty: 1 | Refills: 0 | Status: ACTIVE | COMMUNITY
Start: 2023-10-25 | End: 1900-01-01

## 2023-10-26 ENCOUNTER — NON-APPOINTMENT (OUTPATIENT)
Age: 28
End: 2023-10-26

## 2023-10-27 ENCOUNTER — NON-APPOINTMENT (OUTPATIENT)
Age: 28
End: 2023-10-27

## 2023-10-27 ENCOUNTER — ASOB RESULT (OUTPATIENT)
Age: 28
End: 2023-10-27

## 2023-10-27 ENCOUNTER — APPOINTMENT (OUTPATIENT)
Dept: OBGYN | Facility: CLINIC | Age: 28
End: 2023-10-27
Payer: COMMERCIAL

## 2023-10-27 VITALS
BODY MASS INDEX: 32.39 KG/M2 | HEART RATE: 92 BPM | SYSTOLIC BLOOD PRESSURE: 106 MMHG | WEIGHT: 165 LBS | HEIGHT: 60 IN | DIASTOLIC BLOOD PRESSURE: 74 MMHG

## 2023-10-27 PROCEDURE — 0502F SUBSEQUENT PRENATAL CARE: CPT

## 2023-10-30 ENCOUNTER — NON-APPOINTMENT (OUTPATIENT)
Age: 28
End: 2023-10-30

## 2023-10-30 ENCOUNTER — APPOINTMENT (OUTPATIENT)
Dept: OBGYN | Facility: CLINIC | Age: 28
End: 2023-10-30
Payer: COMMERCIAL

## 2023-10-30 LAB
CANDIDA VAG CYTO: DETECTED
G VAGINALIS+PREV SP MTYP VAG QL MICRO: NOT DETECTED
T VAGINALIS VAG QL WET PREP: NOT DETECTED

## 2023-10-30 PROCEDURE — 0502F SUBSEQUENT PRENATAL CARE: CPT

## 2023-10-30 RX ORDER — TERCONAZOLE 80 MG/1
80 SUPPOSITORY VAGINAL
Qty: 3 | Refills: 0 | Status: ACTIVE | COMMUNITY
Start: 2023-10-30 | End: 1900-01-01

## 2023-11-01 ENCOUNTER — ASOB RESULT (OUTPATIENT)
Age: 28
End: 2023-11-01

## 2023-11-01 ENCOUNTER — APPOINTMENT (OUTPATIENT)
Dept: OBGYN | Facility: CLINIC | Age: 28
End: 2023-11-01
Payer: COMMERCIAL

## 2023-11-01 ENCOUNTER — NON-APPOINTMENT (OUTPATIENT)
Age: 28
End: 2023-11-01

## 2023-11-01 PROCEDURE — 76819 FETAL BIOPHYS PROFIL W/O NST: CPT

## 2023-11-01 PROCEDURE — 0502F SUBSEQUENT PRENATAL CARE: CPT

## 2023-11-02 PROBLEM — Z34.92 ENCOUNTER FOR SUPERVISION OF NORMAL PREGNANCY IN SECOND TRIMESTER: Status: RESOLVED | Noted: 2023-07-24 | Resolved: 2023-11-02

## 2023-11-07 ENCOUNTER — NON-APPOINTMENT (OUTPATIENT)
Age: 28
End: 2023-11-07

## 2023-11-08 ENCOUNTER — APPOINTMENT (OUTPATIENT)
Dept: OBGYN | Facility: CLINIC | Age: 28
End: 2023-11-08
Payer: COMMERCIAL

## 2023-11-08 ENCOUNTER — INPATIENT (INPATIENT)
Facility: HOSPITAL | Age: 28
LOS: 3 days | Discharge: HOME | End: 2023-11-12
Attending: OBSTETRICS & GYNECOLOGY | Admitting: OBSTETRICS & GYNECOLOGY
Payer: COMMERCIAL

## 2023-11-08 ENCOUNTER — TRANSCRIPTION ENCOUNTER (OUTPATIENT)
Age: 28
End: 2023-11-08

## 2023-11-08 ENCOUNTER — NON-APPOINTMENT (OUTPATIENT)
Age: 28
End: 2023-11-08

## 2023-11-08 ENCOUNTER — ASOB RESULT (OUTPATIENT)
Age: 28
End: 2023-11-08

## 2023-11-08 VITALS — OXYGEN SATURATION: 98 % | HEART RATE: 84 BPM

## 2023-11-08 DIAGNOSIS — Z98.89 OTHER SPECIFIED POSTPROCEDURAL STATES: Chronic | ICD-10-CM

## 2023-11-08 DIAGNOSIS — Z34.80 ENCOUNTER FOR SUPERVISION OF OTHER NORMAL PREGNANCY, UNSPECIFIED TRIMESTER: ICD-10-CM

## 2023-11-08 DIAGNOSIS — O26.899 OTHER SPECIFIED PREGNANCY RELATED CONDITIONS, UNSPECIFIED TRIMESTER: ICD-10-CM

## 2023-11-08 PROCEDURE — 0502F SUBSEQUENT PRENATAL CARE: CPT

## 2023-11-08 PROCEDURE — 76818 FETAL BIOPHYS PROFILE W/NST: CPT

## 2023-11-08 RX ORDER — OXYTOCIN 10 UNIT/ML
333.33 VIAL (ML) INJECTION
Qty: 20 | Refills: 0 | Status: DISCONTINUED | OUTPATIENT
Start: 2023-11-08 | End: 2023-11-12

## 2023-11-08 RX ORDER — CHLORHEXIDINE GLUCONATE 213 G/1000ML
1 SOLUTION TOPICAL DAILY
Refills: 0 | Status: DISCONTINUED | OUTPATIENT
Start: 2023-11-08 | End: 2023-11-10

## 2023-11-08 RX ORDER — SODIUM CHLORIDE 9 MG/ML
1000 INJECTION, SOLUTION INTRAVENOUS
Refills: 0 | Status: DISCONTINUED | OUTPATIENT
Start: 2023-11-08 | End: 2023-11-10

## 2023-11-08 RX ORDER — CITRIC ACID/SODIUM CITRATE 300-500 MG
15 SOLUTION, ORAL ORAL EVERY 6 HOURS
Refills: 0 | Status: DISCONTINUED | OUTPATIENT
Start: 2023-11-08 | End: 2023-11-10

## 2023-11-08 NOTE — OB PROVIDER H&P - HISTORY OF PRESENT ILLNESS
27yo  at 39+1 presenting for induction of labor for polyhydramnios (PAPA 28).     PNC c/b polyhydramnios as above  GBS: neg  EFW: 2790@36w, est 3200    ObHx: denies  GynHx: denies  MedHx: RBBB diagnosed earlier in this pregnancy - patient hospitalized for syncope with palpitations, follows with Dr. Saleh, s/p normal echo and holter monitor, which showed sinus tachycardia  SrgHx: Tonsillectomy  PsychHx: denies  SocialHx: denies  AllergyHx: IV contrast  RxHx: PNV, VIt D

## 2023-11-08 NOTE — OB PROVIDER H&P - NSHPPHYSICALEXAM_GEN_ALL_CORE
ICU Vital Signs Last 24 Hrs  T(C): --  T(F): --  HR: 98 (08 Nov 2023 23:22) (83 - 98)  BP: 109/66 (08 Nov 2023 22:58) (109/66 - 109/66)  BP(mean): --  ABP: --  ABP(mean): --  RR: --  SpO2: 97% (08 Nov 2023 23:22) (97% - 99%)    Gen: NAD  Abd: Soft, nontender  Ext: No calf tenderness   SVE: 1/70/-3

## 2023-11-08 NOTE — OB PROVIDER H&P - ASSESSMENT
29yo  at 39+1 presenting for induction of labor for polyhydramnios   - Admit to L&D  - Routine labs   - EFM & toco  - CLD & IVF       - GBS neg        - Epidural PRN  - Buccal cytotec for induction    DAMIAN Couch, PGY3

## 2023-11-09 LAB
BASOPHILS # BLD AUTO: 0.06 K/UL — SIGNIFICANT CHANGE UP (ref 0–0.2)
BASOPHILS # BLD AUTO: 0.06 K/UL — SIGNIFICANT CHANGE UP (ref 0–0.2)
BASOPHILS NFR BLD AUTO: 0.4 % — SIGNIFICANT CHANGE UP (ref 0–2)
BASOPHILS NFR BLD AUTO: 0.4 % — SIGNIFICANT CHANGE UP (ref 0–2)
BLD GP AB SCN SERPL QL: NEGATIVE — SIGNIFICANT CHANGE UP
BLD GP AB SCN SERPL QL: NEGATIVE — SIGNIFICANT CHANGE UP
EOSINOPHIL # BLD AUTO: 0.08 K/UL — SIGNIFICANT CHANGE UP (ref 0–0.5)
EOSINOPHIL # BLD AUTO: 0.08 K/UL — SIGNIFICANT CHANGE UP (ref 0–0.5)
EOSINOPHIL NFR BLD AUTO: 0.5 % — SIGNIFICANT CHANGE UP (ref 0–6)
EOSINOPHIL NFR BLD AUTO: 0.5 % — SIGNIFICANT CHANGE UP (ref 0–6)
HCT VFR BLD CALC: 33.7 % — LOW (ref 34.5–45)
HCT VFR BLD CALC: 33.7 % — LOW (ref 34.5–45)
HGB BLD-MCNC: 11.4 G/DL — LOW (ref 11.5–15.5)
HGB BLD-MCNC: 11.4 G/DL — LOW (ref 11.5–15.5)
IMM GRANULOCYTES NFR BLD AUTO: 1.4 % — HIGH (ref 0–0.9)
IMM GRANULOCYTES NFR BLD AUTO: 1.4 % — HIGH (ref 0–0.9)
LYMPHOCYTES # BLD AUTO: 21 % — SIGNIFICANT CHANGE UP (ref 13–44)
LYMPHOCYTES # BLD AUTO: 21 % — SIGNIFICANT CHANGE UP (ref 13–44)
LYMPHOCYTES # BLD AUTO: 3.5 K/UL — HIGH (ref 1–3.3)
LYMPHOCYTES # BLD AUTO: 3.5 K/UL — HIGH (ref 1–3.3)
MCHC RBC-ENTMCNC: 30.6 PG — SIGNIFICANT CHANGE UP (ref 27–34)
MCHC RBC-ENTMCNC: 30.6 PG — SIGNIFICANT CHANGE UP (ref 27–34)
MCHC RBC-ENTMCNC: 33.8 GM/DL — SIGNIFICANT CHANGE UP (ref 32–36)
MCHC RBC-ENTMCNC: 33.8 GM/DL — SIGNIFICANT CHANGE UP (ref 32–36)
MCV RBC AUTO: 90.6 FL — SIGNIFICANT CHANGE UP (ref 80–100)
MCV RBC AUTO: 90.6 FL — SIGNIFICANT CHANGE UP (ref 80–100)
MONOCYTES # BLD AUTO: 1.42 K/UL — HIGH (ref 0–0.9)
MONOCYTES # BLD AUTO: 1.42 K/UL — HIGH (ref 0–0.9)
MONOCYTES NFR BLD AUTO: 8.5 % — SIGNIFICANT CHANGE UP (ref 2–14)
MONOCYTES NFR BLD AUTO: 8.5 % — SIGNIFICANT CHANGE UP (ref 2–14)
NEUTROPHILS # BLD AUTO: 11.35 K/UL — HIGH (ref 1.8–7.4)
NEUTROPHILS # BLD AUTO: 11.35 K/UL — HIGH (ref 1.8–7.4)
NEUTROPHILS NFR BLD AUTO: 68.2 % — SIGNIFICANT CHANGE UP (ref 43–77)
NEUTROPHILS NFR BLD AUTO: 68.2 % — SIGNIFICANT CHANGE UP (ref 43–77)
NRBC # BLD: 0 /100 WBCS — SIGNIFICANT CHANGE UP (ref 0–0)
NRBC # BLD: 0 /100 WBCS — SIGNIFICANT CHANGE UP (ref 0–0)
PLATELET # BLD AUTO: 224 K/UL — SIGNIFICANT CHANGE UP (ref 150–400)
PLATELET # BLD AUTO: 224 K/UL — SIGNIFICANT CHANGE UP (ref 150–400)
RBC # BLD: 3.72 M/UL — LOW (ref 3.8–5.2)
RBC # BLD: 3.72 M/UL — LOW (ref 3.8–5.2)
RBC # FLD: 13 % — SIGNIFICANT CHANGE UP (ref 10.3–14.5)
RBC # FLD: 13 % — SIGNIFICANT CHANGE UP (ref 10.3–14.5)
RH IG SCN BLD-IMP: POSITIVE — SIGNIFICANT CHANGE UP
RH IG SCN BLD-IMP: POSITIVE — SIGNIFICANT CHANGE UP
T PALLIDUM AB TITR SER: NEGATIVE — SIGNIFICANT CHANGE UP
T PALLIDUM AB TITR SER: NEGATIVE — SIGNIFICANT CHANGE UP
WBC # BLD: 16.65 K/UL — HIGH (ref 3.8–10.5)
WBC # BLD: 16.65 K/UL — HIGH (ref 3.8–10.5)
WBC # FLD AUTO: 16.65 K/UL — HIGH (ref 3.8–10.5)
WBC # FLD AUTO: 16.65 K/UL — HIGH (ref 3.8–10.5)

## 2023-11-09 PROCEDURE — 59510 CESAREAN DELIVERY: CPT

## 2023-11-09 PROCEDURE — 88307 TISSUE EXAM BY PATHOLOGIST: CPT | Mod: 26

## 2023-11-09 DEVICE — SURGICEL FIBRILLAR 2 X 4": Type: IMPLANTABLE DEVICE | Status: FUNCTIONAL

## 2023-11-09 RX ORDER — SODIUM CHLORIDE 9 MG/ML
500 INJECTION, SOLUTION INTRAVENOUS ONCE
Refills: 0 | Status: COMPLETED | OUTPATIENT
Start: 2023-11-09 | End: 2023-11-09

## 2023-11-09 RX ORDER — OXYTOCIN 10 UNIT/ML
2 VIAL (ML) INJECTION
Qty: 30 | Refills: 0 | Status: DISCONTINUED | OUTPATIENT
Start: 2023-11-09 | End: 2023-11-12

## 2023-11-09 RX ORDER — SODIUM CHLORIDE 9 MG/ML
500 INJECTION INTRAMUSCULAR; INTRAVENOUS; SUBCUTANEOUS
Refills: 0 | Status: DISCONTINUED | OUTPATIENT
Start: 2023-11-09 | End: 2023-11-12

## 2023-11-09 RX ORDER — OXYTOCIN 10 UNIT/ML
4 VIAL (ML) INJECTION
Qty: 30 | Refills: 0 | Status: DISCONTINUED | OUTPATIENT
Start: 2023-11-09 | End: 2023-11-12

## 2023-11-09 RX ORDER — INFLUENZA VIRUS VACCINE 15; 15; 15; 15 UG/.5ML; UG/.5ML; UG/.5ML; UG/.5ML
0.5 SUSPENSION INTRAMUSCULAR ONCE
Refills: 0 | Status: DISCONTINUED | OUTPATIENT
Start: 2023-11-09 | End: 2023-11-12

## 2023-11-09 RX ADMIN — SODIUM CHLORIDE 2000 MILLILITER(S): 9 INJECTION, SOLUTION INTRAVENOUS at 19:12

## 2023-11-09 RX ADMIN — SODIUM CHLORIDE 125 MILLILITER(S): 9 INJECTION, SOLUTION INTRAVENOUS at 00:15

## 2023-11-09 RX ADMIN — SODIUM CHLORIDE 125 MILLILITER(S): 9 INJECTION, SOLUTION INTRAVENOUS at 00:00

## 2023-11-09 RX ADMIN — Medication 4 MILLIUNIT(S)/MIN: at 14:14

## 2023-11-09 RX ADMIN — Medication 2 MILLIUNIT(S)/MIN: at 20:40

## 2023-11-09 RX ADMIN — SODIUM CHLORIDE 125 MILLILITER(S): 9 INJECTION INTRAMUSCULAR; INTRAVENOUS; SUBCUTANEOUS at 21:18

## 2023-11-09 NOTE — OB RN PATIENT PROFILE - EDUCATION OF THE PLACEMENT OF INFANT DURING SKIN TO SKIN: THE INFANT IS TO BE PLACED BELLY DOWN DIRECTLY ON PARENT'S CHEST, POSITIONED WITH INFANT'S FACE TOWARD PARENT'S FACE, SO THE PARENT CAN OBSERVE THE INFANT’S COLOR AT ALL TIMES.
Problem: Non-Pressure Injury Wound  Goal: # No deterioration in wound  Outcome: Outcome Met, Continue evaluating goal progress toward completion    Patient seen in Clinic by: PARISA Mtz NP    Wound(s) debrided by provider: Yes Consent obtained/verified: Yes    Wound care orders/updates: Yes - hold wrap for this week due to cellulitis, return on Friday for MD visit    Lab(s)/additional orders placed this visit: No    Patient education complete: Yes    Patient verbalized understanding of education/patient questions answered: Yes    AVS provided to patient/caregiver/facility: Yes               Statement Selected

## 2023-11-09 NOTE — OB PROVIDER LABOR PROGRESS NOTE - NS_SUBJECTIVE/OBJECTIVE_OBGYN_ALL_OB_FT
Comfortable
Comfortable
Comfortable with epidural, right leg more numb than left  Ruptured clear fluid  IUPC and ISC placed
Comfortable with epidural  She had recurrent late decelerations, pitocin was paused  I removed the cook balloon
I assumed care of this patient at 8am.   She is well known to me from her prenatal care visits. I reviewed her prenatal care records and I saw her in the office yesterday.  Consents for labor and delivery management, induction of labor, vaginal delivery, possible vacuum and possible  delivery were signed and placed on chart.   She is having a boy named Jason, consent for circumcision was signed and placed on chart.

## 2023-11-09 NOTE — OB PROVIDER LABOR PROGRESS NOTE - NS_OBIHIFHRDETAILS_OBGYN_ALL_OB_FT
Now 140, reactive without decelerations
150, mod ana, reactive, prolonged and early decelerations
155, mod with period of minimal variability, late decelerations when on left side, responds to scalp stimulation
Continues to have subtle lates
Baseline 150, mod ana, reactive with occasional variable decelerations

## 2023-11-09 NOTE — OB RN INTRAOPERATIVE NOTE - NSSELHIDDEN_OBGYN_ALL_OB_FT
[NS_DeliveryAttending1_OBGYN_ALL_OB_FT:[id number]],[NS_DeliveryAssist1_OBGYN_ALL_OB_FT:[id number]],[NS_DeliveryRN_OBGYN_ALL_OB_FT:[id number]]

## 2023-11-09 NOTE — PRE-ANESTHESIA EVALUATION ADULT - NSANTHOSAYNRD_GEN_A_CORE
No. SYLVIE screening performed.  STOP BANG Legend: 0-2 = LOW Risk; 3-4 = INTERMEDIATE Risk; 5-8 = HIGH Risk

## 2023-11-09 NOTE — OB RN PATIENT PROFILE - PATIENT REPRESENTATIVE NAME
Subjective:   Patient ID: Dylan Flores is a 59year old male. Patient presents with:  Cough: C/o on/off cough for about a month. Stts its more present when he's sleeping. Denies any nausea, vomiting, sore throat, or fever/chills.       Cough  Chronicity: membrane, ear canal and external ear normal.      Left Ear: Tympanic membrane, ear canal and external ear normal.      Nose:      Right Sinus: No maxillary sinus tenderness or frontal sinus tenderness.       Left Sinus: No maxillary sinus tenderness or fron avoid NSAID's - Aspirin-based medications  Advised to avoid wearing  tight clothes   Advised to elevate the head of the bed   Avoid eating at least 3 hours before bedtime   Counseling on ideal weight/BMI  Take PPIs qd in the morning 30-60 minutes before br Jason Sharma

## 2023-11-09 NOTE — OB RN PATIENT PROFILE - NSSTATEDREASONFORADM_OBGYN_A_OB_FT
"I am here for my induction. They told me in the office today to come because I have high fluid levels."

## 2023-11-09 NOTE — OB PROVIDER LABOR PROGRESS NOTE - NS_OBIHICONTRACTIONPATTERNDETAILS_OBGYN_ALL_OB_FT
Every 2-4 minutes, not on oxytocin
Every 4 minutes, not on pitocin
Irregular
q5-7min
Coupling, pitocin off

## 2023-11-09 NOTE — OB PROVIDER LABOR PROGRESS NOTE - ASSESSMENT
27yo  at 39+2 induction of labor for polyhydramnios      - GBS neg        - Epidural in place and functional  - s/p buccal cytotec for induction  - Cook's catheter placed at 1:00pm  - Oxytocin 4x4 ordered    Geremias BENAVIDES
27yo  at 39+2 induction of labor for polyhydramnios      - GBS neg        - Epidural in place and functional  - s/p buccal cytotec for induction  - s/p Cook's catheter  - Oxytocin stopped  - intrauterine resuscitation - fluid bolus and positioning  - IUPC and ISC in place  If fetus does not respond to resuscitation ->       Geremias BENAVIDES
39 weeks polyhydramnios IOL  To OR for  delivery  Geremias BENAVIDES
27yo  at 39+2 induction of labor for polyhydramnios      - GBS neg        - Epidural in place and functional  - s/p buccal cytotec for induction  - s/p Cook's catheter   - Oxytocin stopped  - Peanut ball  - will rupture membranes    Geremias BENAVIDES
27yo  at 39+2 induction of labor for polyhydramnios      - GBS neg        - Epidural in place and functional  - s/p buccal cytotec for induction  - s/p Cook's catheter  - Oxytocin stopped  - intrauterine resuscitation - fluid bolus and positioning  - IUPC and ISC in place  If fetus does not respond to resuscitation ->   Had PTA  Will wait 1 hour and recheck since she made change  Unless there are continued lates then will proceed before 1 hour  All questions answered  Geremias BENAVIDES

## 2023-11-10 LAB
BASOPHILS # BLD AUTO: 0 K/UL — SIGNIFICANT CHANGE UP (ref 0–0.2)
BASOPHILS # BLD AUTO: 0 K/UL — SIGNIFICANT CHANGE UP (ref 0–0.2)
BASOPHILS NFR BLD AUTO: 0 % — SIGNIFICANT CHANGE UP (ref 0–2)
BASOPHILS NFR BLD AUTO: 0 % — SIGNIFICANT CHANGE UP (ref 0–2)
EOSINOPHIL # BLD AUTO: 0 K/UL — SIGNIFICANT CHANGE UP (ref 0–0.5)
EOSINOPHIL # BLD AUTO: 0 K/UL — SIGNIFICANT CHANGE UP (ref 0–0.5)
EOSINOPHIL NFR BLD AUTO: 0 % — SIGNIFICANT CHANGE UP (ref 0–6)
EOSINOPHIL NFR BLD AUTO: 0 % — SIGNIFICANT CHANGE UP (ref 0–6)
HCT VFR BLD CALC: 30.1 % — LOW (ref 34.5–45)
HCT VFR BLD CALC: 30.1 % — LOW (ref 34.5–45)
HGB BLD-MCNC: 9.9 G/DL — LOW (ref 11.5–15.5)
HGB BLD-MCNC: 9.9 G/DL — LOW (ref 11.5–15.5)
LYMPHOCYTES # BLD AUTO: 1.06 K/UL — SIGNIFICANT CHANGE UP (ref 1–3.3)
LYMPHOCYTES # BLD AUTO: 1.06 K/UL — SIGNIFICANT CHANGE UP (ref 1–3.3)
LYMPHOCYTES # BLD AUTO: 4.4 % — LOW (ref 13–44)
LYMPHOCYTES # BLD AUTO: 4.4 % — LOW (ref 13–44)
MANUAL SMEAR VERIFICATION: SIGNIFICANT CHANGE UP
MANUAL SMEAR VERIFICATION: SIGNIFICANT CHANGE UP
MCHC RBC-ENTMCNC: 30.3 PG — SIGNIFICANT CHANGE UP (ref 27–34)
MCHC RBC-ENTMCNC: 30.3 PG — SIGNIFICANT CHANGE UP (ref 27–34)
MCHC RBC-ENTMCNC: 32.9 GM/DL — SIGNIFICANT CHANGE UP (ref 32–36)
MCHC RBC-ENTMCNC: 32.9 GM/DL — SIGNIFICANT CHANGE UP (ref 32–36)
MCV RBC AUTO: 92 FL — SIGNIFICANT CHANGE UP (ref 80–100)
MCV RBC AUTO: 92 FL — SIGNIFICANT CHANGE UP (ref 80–100)
MONOCYTES # BLD AUTO: 1.87 K/UL — HIGH (ref 0–0.9)
MONOCYTES # BLD AUTO: 1.87 K/UL — HIGH (ref 0–0.9)
MONOCYTES NFR BLD AUTO: 7.8 % — SIGNIFICANT CHANGE UP (ref 2–14)
MONOCYTES NFR BLD AUTO: 7.8 % — SIGNIFICANT CHANGE UP (ref 2–14)
NEUTROPHILS # BLD AUTO: 21.05 K/UL — HIGH (ref 1.8–7.4)
NEUTROPHILS # BLD AUTO: 21.05 K/UL — HIGH (ref 1.8–7.4)
NEUTROPHILS NFR BLD AUTO: 85.2 % — HIGH (ref 43–77)
NEUTROPHILS NFR BLD AUTO: 85.2 % — HIGH (ref 43–77)
NEUTS BAND # BLD: 2.6 % — SIGNIFICANT CHANGE UP (ref 0–8)
NEUTS BAND # BLD: 2.6 % — SIGNIFICANT CHANGE UP (ref 0–8)
PLAT MORPH BLD: NORMAL — SIGNIFICANT CHANGE UP
PLAT MORPH BLD: NORMAL — SIGNIFICANT CHANGE UP
PLATELET # BLD AUTO: 193 K/UL — SIGNIFICANT CHANGE UP (ref 150–400)
PLATELET # BLD AUTO: 193 K/UL — SIGNIFICANT CHANGE UP (ref 150–400)
RBC # BLD: 3.27 M/UL — LOW (ref 3.8–5.2)
RBC # BLD: 3.27 M/UL — LOW (ref 3.8–5.2)
RBC # FLD: 13.3 % — SIGNIFICANT CHANGE UP (ref 10.3–14.5)
RBC # FLD: 13.3 % — SIGNIFICANT CHANGE UP (ref 10.3–14.5)
RBC BLD AUTO: SIGNIFICANT CHANGE UP
RBC BLD AUTO: SIGNIFICANT CHANGE UP
WBC # BLD: 23.98 K/UL — HIGH (ref 3.8–10.5)
WBC # BLD: 23.98 K/UL — HIGH (ref 3.8–10.5)
WBC # FLD AUTO: 23.98 K/UL — HIGH (ref 3.8–10.5)
WBC # FLD AUTO: 23.98 K/UL — HIGH (ref 3.8–10.5)

## 2023-11-10 RX ORDER — DIPHENHYDRAMINE HCL 50 MG
25 CAPSULE ORAL EVERY 6 HOURS
Refills: 0 | Status: DISCONTINUED | OUTPATIENT
Start: 2023-11-10 | End: 2023-11-12

## 2023-11-10 RX ORDER — SODIUM CHLORIDE 9 MG/ML
500 INJECTION, SOLUTION INTRAVENOUS ONCE
Refills: 0 | Status: COMPLETED | OUTPATIENT
Start: 2023-11-10 | End: 2023-11-10

## 2023-11-10 RX ORDER — NALBUPHINE HYDROCHLORIDE 10 MG/ML
2.5 INJECTION, SOLUTION INTRAMUSCULAR; INTRAVENOUS; SUBCUTANEOUS EVERY 6 HOURS
Refills: 0 | Status: DISCONTINUED | OUTPATIENT
Start: 2023-11-10 | End: 2023-11-12

## 2023-11-10 RX ORDER — SODIUM CHLORIDE 9 MG/ML
1000 INJECTION, SOLUTION INTRAVENOUS
Refills: 0 | Status: DISCONTINUED | OUTPATIENT
Start: 2023-11-10 | End: 2023-11-12

## 2023-11-10 RX ORDER — SIMETHICONE 80 MG/1
80 TABLET, CHEWABLE ORAL EVERY 4 HOURS
Refills: 0 | Status: DISCONTINUED | OUTPATIENT
Start: 2023-11-10 | End: 2023-11-12

## 2023-11-10 RX ORDER — DEXAMETHASONE 0.5 MG/5ML
4 ELIXIR ORAL EVERY 6 HOURS
Refills: 0 | Status: DISCONTINUED | OUTPATIENT
Start: 2023-11-10 | End: 2023-11-12

## 2023-11-10 RX ORDER — MORPHINE SULFATE 50 MG/1
2 CAPSULE, EXTENDED RELEASE ORAL ONCE
Refills: 0 | Status: DISCONTINUED | OUTPATIENT
Start: 2023-11-10 | End: 2023-11-10

## 2023-11-10 RX ORDER — ACETAMINOPHEN 500 MG
975 TABLET ORAL
Refills: 0 | Status: DISCONTINUED | OUTPATIENT
Start: 2023-11-10 | End: 2023-11-12

## 2023-11-10 RX ORDER — KETOROLAC TROMETHAMINE 30 MG/ML
30 SYRINGE (ML) INJECTION EVERY 6 HOURS
Refills: 0 | Status: DISCONTINUED | OUTPATIENT
Start: 2023-11-10 | End: 2023-11-11

## 2023-11-10 RX ORDER — NALOXONE HYDROCHLORIDE 4 MG/.1ML
0.1 SPRAY NASAL
Refills: 0 | Status: DISCONTINUED | OUTPATIENT
Start: 2023-11-10 | End: 2023-11-12

## 2023-11-10 RX ORDER — ASCORBIC ACID 60 MG
500 TABLET,CHEWABLE ORAL THREE TIMES A DAY
Refills: 0 | Status: DISCONTINUED | OUTPATIENT
Start: 2023-11-10 | End: 2023-11-12

## 2023-11-10 RX ORDER — TETANUS TOXOID, REDUCED DIPHTHERIA TOXOID AND ACELLULAR PERTUSSIS VACCINE, ADSORBED 5; 2.5; 8; 8; 2.5 [IU]/.5ML; [IU]/.5ML; UG/.5ML; UG/.5ML; UG/.5ML
0.5 SUSPENSION INTRAMUSCULAR ONCE
Refills: 0 | Status: DISCONTINUED | OUTPATIENT
Start: 2023-11-10 | End: 2023-11-12

## 2023-11-10 RX ORDER — OXYCODONE HYDROCHLORIDE 5 MG/1
5 TABLET ORAL
Refills: 0 | Status: DISCONTINUED | OUTPATIENT
Start: 2023-11-10 | End: 2023-11-12

## 2023-11-10 RX ORDER — MAGNESIUM HYDROXIDE 400 MG/1
30 TABLET, CHEWABLE ORAL
Refills: 0 | Status: DISCONTINUED | OUTPATIENT
Start: 2023-11-10 | End: 2023-11-12

## 2023-11-10 RX ORDER — ONDANSETRON 8 MG/1
4 TABLET, FILM COATED ORAL EVERY 6 HOURS
Refills: 0 | Status: DISCONTINUED | OUTPATIENT
Start: 2023-11-10 | End: 2023-11-12

## 2023-11-10 RX ORDER — ACETAMINOPHEN 500 MG
1000 TABLET ORAL ONCE
Refills: 0 | Status: COMPLETED | OUTPATIENT
Start: 2023-11-10 | End: 2023-11-10

## 2023-11-10 RX ORDER — OXYTOCIN 10 UNIT/ML
333.33 VIAL (ML) INJECTION
Qty: 20 | Refills: 0 | Status: DISCONTINUED | OUTPATIENT
Start: 2023-11-10 | End: 2023-11-12

## 2023-11-10 RX ORDER — OXYCODONE HYDROCHLORIDE 5 MG/1
5 TABLET ORAL ONCE
Refills: 0 | Status: DISCONTINUED | OUTPATIENT
Start: 2023-11-10 | End: 2023-11-12

## 2023-11-10 RX ORDER — IBUPROFEN 200 MG
600 TABLET ORAL EVERY 6 HOURS
Refills: 0 | Status: COMPLETED | OUTPATIENT
Start: 2023-11-10 | End: 2024-10-08

## 2023-11-10 RX ORDER — FERROUS SULFATE 325(65) MG
325 TABLET ORAL THREE TIMES A DAY
Refills: 0 | Status: DISCONTINUED | OUTPATIENT
Start: 2023-11-10 | End: 2023-11-12

## 2023-11-10 RX ORDER — LANOLIN
1 OINTMENT (GRAM) TOPICAL EVERY 6 HOURS
Refills: 0 | Status: DISCONTINUED | OUTPATIENT
Start: 2023-11-10 | End: 2023-11-12

## 2023-11-10 RX ORDER — HEPARIN SODIUM 5000 [USP'U]/ML
5000 INJECTION INTRAVENOUS; SUBCUTANEOUS EVERY 12 HOURS
Refills: 0 | Status: DISCONTINUED | OUTPATIENT
Start: 2023-11-10 | End: 2023-11-12

## 2023-11-10 RX ADMIN — Medication 30 MILLIGRAM(S): at 10:06

## 2023-11-10 RX ADMIN — HEPARIN SODIUM 5000 UNIT(S): 5000 INJECTION INTRAVENOUS; SUBCUTANEOUS at 22:13

## 2023-11-10 RX ADMIN — HEPARIN SODIUM 5000 UNIT(S): 5000 INJECTION INTRAVENOUS; SUBCUTANEOUS at 10:06

## 2023-11-10 RX ADMIN — Medication 975 MILLIGRAM(S): at 18:07

## 2023-11-10 RX ADMIN — Medication 975 MILLIGRAM(S): at 12:30

## 2023-11-10 RX ADMIN — Medication 500 MILLIGRAM(S): at 22:12

## 2023-11-10 RX ADMIN — SODIUM CHLORIDE 1000 MILLILITER(S): 9 INJECTION, SOLUTION INTRAVENOUS at 02:00

## 2023-11-10 RX ADMIN — Medication 30 MILLIGRAM(S): at 10:40

## 2023-11-10 RX ADMIN — Medication 975 MILLIGRAM(S): at 13:00

## 2023-11-10 RX ADMIN — Medication 975 MILLIGRAM(S): at 18:51

## 2023-11-10 RX ADMIN — Medication 500 MILLIGRAM(S): at 14:31

## 2023-11-10 RX ADMIN — Medication 325 MILLIGRAM(S): at 22:12

## 2023-11-10 RX ADMIN — Medication 30 MILLIGRAM(S): at 16:25

## 2023-11-10 RX ADMIN — Medication 30 MILLIGRAM(S): at 23:17

## 2023-11-10 RX ADMIN — Medication 325 MILLIGRAM(S): at 14:32

## 2023-11-10 RX ADMIN — Medication 30 MILLIGRAM(S): at 22:13

## 2023-11-10 RX ADMIN — Medication 400 MILLIGRAM(S): at 05:05

## 2023-11-10 NOTE — PROGRESS NOTE ADULT - SUBJECTIVE AND OBJECTIVE BOX
Day 1 of Anesthesia Pain Management Service    SUBJECTIVE: Doing ok  Pain Scale Score:          [X] Refer to charted pain scores    THERAPY:  s/p   2  mg PF epidural morphine on 11\10\2023      MEDICATIONS  (STANDING):  acetaminophen     Tablet .. 975 milliGRAM(s) Oral <User Schedule>  diphtheria/tetanus/pertussis (acellular) Vaccine (Adacel) 0.5 milliLiter(s) IntraMuscular once  heparin   Injectable 5000 Unit(s) SubCutaneous every 12 hours  ibuprofen  Tablet. 600 milliGRAM(s) Oral every 6 hours  influenza   Vaccine 0.5 milliLiter(s) IntraMuscular once  lactated ringers. 1000 milliLiter(s) (125 mL/Hr) IV Continuous <Continuous>  misoprostol 25 MICROGram(s) Buccal every 3 hours  morphine PF Epidural 2 milliGRAM(s) Epidural once  oxytocin Infusion 333.333 milliUNIT(s)/Min (1000 mL/Hr) IV Continuous <Continuous>  oxytocin Infusion 333.333 milliUNIT(s)/Min (1000 mL/Hr) IV Continuous <Continuous>  oxytocin Infusion. 4 milliUNIT(s)/Min (4 mL/Hr) IV Continuous <Continuous>  oxytocin Infusion. 2 milliUNIT(s)/Min (2 mL/Hr) IV Continuous <Continuous>  sodium chloride 0.9%. 500 milliLiter(s) (125 mL/Hr) IV Continuous <Continuous>    MEDICATIONS  (PRN):  dexAMETHasone  Injectable 4 milliGRAM(s) IV Push every 6 hours PRN Nausea  diphenhydrAMINE 25 milliGRAM(s) Oral every 6 hours PRN Pruritus  lanolin Ointment 1 Application(s) Topical every 6 hours PRN Sore Nipples  magnesium hydroxide Suspension 30 milliLiter(s) Oral two times a day PRN Constipation  nalbuphine Injectable 2.5 milliGRAM(s) IV Push every 6 hours PRN Pruritus  naloxone Injectable 0.1 milliGRAM(s) IV Push every 3 minutes PRN For ANY of the following changes in patient status:  A. Breaths Per Minute LESS THAN 10, B. Oxygen saturation LESS THAN 90%, C. Sedation score of 6 for Stop After: 4 Times  ondansetron Injectable 4 milliGRAM(s) IV Push every 6 hours PRN Nausea  oxyCODONE    IR 5 milliGRAM(s) Oral every 3 hours PRN Moderate to Severe Pain (4-10)  oxyCODONE    IR 5 milliGRAM(s) Oral once PRN Moderate to Severe Pain (4-10)  simethicone 80 milliGRAM(s) Chew every 4 hours PRN Gas      OBJECTIVE:    Sedation:        	[X] Alert	 [ ] Drowsy	[ ] Arousable      [ ] Asleep       [ ] Unresponsive    Side Effects:	[X] None 	[ ] Nausea	[ ] Vomiting         [ ] Pruritus  		[ ] Weakness            [ ] Numbness	          [ ] Other:    Vital Signs Last 24 Hrs  T(C): 37.1 (10 Nov 2023 06:30), Max: 37.5 (10 Nov 2023 01:00)  T(F): 98.7 (10 Nov 2023 06:30), Max: 99.5 (10 Nov 2023 01:00)  HR: 81 (10 Nov 2023 06:30) (59 - 122)  BP: 102/60 (10 Nov 2023 06:30) (87/49 - 151/73)  BP(mean): 82 (10 Nov 2023 05:17) (65 - 83)  RR: 18 (10 Nov 2023 06:30) (18 - 18)  SpO2: 100% (10 Nov 2023 06:30) (90% - 100%)    Parameters below as of 10 Nov 2023 05:17  Patient On (Oxygen Delivery Method): room air        ASSESSMENT/ PLAN  [X] Patient to be transitioned to prn analgesics after 24 hours  [X] Pain management per primary service, pain service to sign off   [X]Documentation and Verification of current medications

## 2023-11-10 NOTE — OB NEONATOLOGY/PEDIATRICIAN DELIVERY SUMMARY - NSPEDSNEONOTESA_OBGYN_ALL_OB_FT
Pediatrician called to delivery for Cat II tracing. Male infant born at 39.2w via  to a 27yo  blood type O+ mother.  No significant maternal or prenatal history. Pregnancy complicated by oligohydramnios. Prenatal labs nr/immune/-, GBS - on 10/18. AROM on 10/09 at 18:34 (6hrs prior to delivery) w/ clear fluids. Delivery notable for nuchal x2 and terminal meconium. Baby emerged vigorous, crying. Cord clamping delayed 30sec. Infant was brought to radiant warmer and warmed, dried, stimulated and suctioned. HR>100, normal respiratory effort. APGARS of 8/9.     Mom is initiating breast feeding. Consents to Hepatitis B vaccination. Desires for infant to be circumcised. EOS score 0.13 (Tmax 37.1 ROM 6hr, GBS neg).    Infant admitted to NBN for routine  care.

## 2023-11-10 NOTE — PROGRESS NOTE ADULT - SUBJECTIVE AND OBJECTIVE BOX
Postpartum Note-  Section POD#1    Allergies: IV Contrast (Hives; Rash)    Blood type: O positive  Rubella: Immune  RPR: Negative     S: Patient is a 28y  POD#1 s/p pLTCS for category II tracing.  When pt was being transferred from PACU to , she had a pre-syncopal episode. She states she felt hot and was dizzy. She was lying down at the time and did not lose consciousness.   Patient currently feels well. She denies CP, SOB, dizziness or palpitations. Pain is controlled. She is tolerating PO.   Patient still has garcia catheter in place and has not been OOB yet. Lochia WNL.     O:  Vital Signs Last 24 Hrs  T(C): 37.1 (10 Nov 2023 06:30), Max: 37.5 (10 Nov 2023 01:00)  T(F): 98.7 (10 Nov 2023 06:30), Max: 99.5 (10 Nov 2023 01:00)  HR: 81 (10 Nov 2023 06:30) (59 - 122)  BP: 102/60 (10 Nov 2023 06:30) (87/49 - 151/73)  BP(mean): 82 (10 Nov 2023 05:17) (65 - 83)  RR: 18 (10 Nov 2023 06:30) (18 - 18)  SpO2: 100% (10 Nov 2023 06:30) (90% - 100%)    Parameters below as of 10 Nov 2023 05:17  Patient On (Oxygen Delivery Method): room air      I&O's Summary    2023 07:01  -  10 Nov 2023 07:00  --------------------------------------------------------  IN: 3100 mL / OUT: 5489 mL / NET: -2389 mL      Gen: NAD  Abdomen: Soft, nontender, non-distended, fundus firm.  Incision: Clean, dry, and intact. Negative erythema/edema/ecchymosis. Sub Q  Lochia WNL  Ext: PAS in place. Negative Homans B/L    LABS:                          11.4   16.65 )-----------( 224      ( 2023 00:24 )             33.7       A/P:  28y POD#1 s/p pLTCS, doing well.      PMHx: Right BBB-diagnosed during pregnancy, normal ECHO, follows with Dr. Saleh  Current Issues: Pre-syncopal episode when transferred to PP-VSS and pt now feels well, giving IVF bolus, will f/u AM CBC and continue to monitor     Increase OOB  DVT ppx  Dressing removed  Garcia catheter to be removed this AM and then due to void  Regular diet  F/u AM CBC  C/w IVF bolus  Continue routine post op care and pain protocol    Desiree Sosa PA-C

## 2023-11-10 NOTE — CHART NOTE - NSCHARTNOTEFT_GEN_A_CORE
PA NOTE     POD#1       Vital Signs Last 24 Hrs  T(C): 36.7 (10 Nov 2023 08:53), Max: 37.5 (10 Nov 2023 01:00)  T(F): 98.1 (10 Nov 2023 08:53), Max: 99.5 (10 Nov 2023 01:00)  HR: 75 (10 Nov 2023 08:53) (59 - 122)  BP: 90/55 (10 Nov 2023 08:53) (87/49 - 151/73)  BP(mean): 82 (10 Nov 2023 05:17) (65 - 83)  RR: 18 (10 Nov 2023 12:36) (18 - 18)  SpO2: 99% (10 Nov 2023 12:36) (90% - 100%)    Parameters below as of 10 Nov 2023 12:36  Patient On (Oxygen Delivery Method): room air                            9.9    23.98 )-----------( 193      ( 10 Nov 2023 09:12 )             30.1     9.9/30.1      Plan:  - Ferrous Sulfate, Colace, Vitamin C supplementation  - Monitor for signs/symptoms of anemia    Desiree Sosa PA-C

## 2023-11-10 NOTE — OB PROVIDER DELIVERY SUMMARY - NSLOWPPHRISK_OBGYN_A_OB
No previous uterine incision/[place] Pregnancy/Less than or equal to 4 previous vaginal births/No known bleeding disorder/No history of postpartum hemorrhage

## 2023-11-10 NOTE — OB RN DELIVERY SUMMARY - NS_LABORCHARACTER_OBGYN_ALL_OB
Induction of labor-AROM/Internal electronic FM/External electronic FM/Meconium staining/Fetal intolerance

## 2023-11-10 NOTE — OB PROVIDER DELIVERY SUMMARY - NSSELHIDDEN_OBGYN_ALL_OB_FT
[NS_DeliveryAttending1_OBGYN_ALL_OB_FT:[id number]==],[NS_DeliveryAssist1_OBGYN_ALL_OB_FT:[id number]=],[NS_DeliveryRN_OBGYN_ALL_OB_FT:[id number]=]

## 2023-11-10 NOTE — OB RN DELIVERY SUMMARY - NSSELHIDDEN_OBGYN_ALL_OB_FT
[NS_DeliveryAttending1_OBGYN_ALL_OB_FT:[id number]==],[NS_DeliveryAssist1_OBGYN_ALL_OB_FT:[id number]=],[NS_DeliveryRN_OBGYN_ALL_OB_FT:[id number]=]

## 2023-11-10 NOTE — OB PROVIDER DELIVERY SUMMARY - NSPROVIDERDELIVERYNOTE_OBGYN_ALL_OB_FT
Non-scheduled urgent pLTCS for category 2 tracing  Viable vertex male infant, apgars 8/9, weight ****g  Hysterotomy closed in 1 layer using PDS  Fibrillar placed over hysterotomy   Grossly normal fallopian tubes and ovaries. 2 small 1cm subserosal fibroids on posterior wall of uterus.  Abdomen closed in standard fashion  Pt and infant to recovery in stable condition  Dictation #  QBL: 489 mL        IVF: 2000 mL        UOP: 900 mL Non-scheduled urgent pLTCS for category 2 tracing  Viable vertex male infant, apgars 8/9, weight 2940g  Hysterotomy closed in 1 layer using PDS  Fibrillar placed over hysterotomy   Grossly normal fallopian tubes and ovaries. 2 small 1cm subserosal fibroids on posterior wall of uterus.  Abdomen closed in standard fashion  Pt and infant to recovery in stable condition  Dictation #[id number]  QBL: 489 mL        IVF: 2000 mL        UOP: 900 mL

## 2023-11-10 NOTE — OB RN DELIVERY SUMMARY - NS_SEPSISRSKCALC_OBGYN_ALL_OB_FT
EOS calculated successfully. EOS Risk Factor: 0.5/1000 live births ([facility] national incidence); GA=39w2d; Temp=98.78; ROM=5.1; GBS='Negative'; Antibiotics='No antibiotics or any antibiotics < 2 hrs prior to birth'

## 2023-11-11 DIAGNOSIS — D62 ACUTE POSTHEMORRHAGIC ANEMIA: ICD-10-CM

## 2023-11-11 RX ORDER — SENNA PLUS 8.6 MG/1
2 TABLET ORAL AT BEDTIME
Refills: 0 | Status: DISCONTINUED | OUTPATIENT
Start: 2023-11-11 | End: 2023-11-12

## 2023-11-11 RX ORDER — SODIUM CHLORIDE 9 MG/ML
1000 INJECTION, SOLUTION INTRAVENOUS ONCE
Refills: 0 | Status: COMPLETED | OUTPATIENT
Start: 2023-11-11 | End: 2023-11-11

## 2023-11-11 RX ORDER — IBUPROFEN 200 MG
600 TABLET ORAL EVERY 6 HOURS
Refills: 0 | Status: DISCONTINUED | OUTPATIENT
Start: 2023-11-11 | End: 2023-11-12

## 2023-11-11 RX ADMIN — Medication 975 MILLIGRAM(S): at 23:56

## 2023-11-11 RX ADMIN — HEPARIN SODIUM 5000 UNIT(S): 5000 INJECTION INTRAVENOUS; SUBCUTANEOUS at 09:01

## 2023-11-11 RX ADMIN — Medication 600 MILLIGRAM(S): at 09:45

## 2023-11-11 RX ADMIN — Medication 975 MILLIGRAM(S): at 06:35

## 2023-11-11 RX ADMIN — Medication 600 MILLIGRAM(S): at 21:15

## 2023-11-11 RX ADMIN — Medication 975 MILLIGRAM(S): at 18:05

## 2023-11-11 RX ADMIN — Medication 975 MILLIGRAM(S): at 12:11

## 2023-11-11 RX ADMIN — Medication 325 MILLIGRAM(S): at 12:12

## 2023-11-11 RX ADMIN — Medication 500 MILLIGRAM(S): at 12:13

## 2023-11-11 RX ADMIN — Medication 600 MILLIGRAM(S): at 09:00

## 2023-11-11 RX ADMIN — Medication 975 MILLIGRAM(S): at 13:10

## 2023-11-11 RX ADMIN — SENNA PLUS 2 TABLET(S): 8.6 TABLET ORAL at 21:15

## 2023-11-11 RX ADMIN — Medication 30 MILLIGRAM(S): at 04:40

## 2023-11-11 RX ADMIN — Medication 975 MILLIGRAM(S): at 00:21

## 2023-11-11 RX ADMIN — Medication 1 ENEMA: at 23:00

## 2023-11-11 RX ADMIN — Medication 30 MILLIGRAM(S): at 03:42

## 2023-11-11 RX ADMIN — Medication 975 MILLIGRAM(S): at 01:15

## 2023-11-11 RX ADMIN — MAGNESIUM HYDROXIDE 30 MILLILITER(S): 400 TABLET, CHEWABLE ORAL at 21:14

## 2023-11-11 RX ADMIN — Medication 600 MILLIGRAM(S): at 22:00

## 2023-11-11 RX ADMIN — Medication 600 MILLIGRAM(S): at 15:45

## 2023-11-11 RX ADMIN — Medication 975 MILLIGRAM(S): at 18:50

## 2023-11-11 RX ADMIN — Medication 600 MILLIGRAM(S): at 14:50

## 2023-11-11 NOTE — PROGRESS NOTE ADULT - SUBJECTIVE AND OBJECTIVE BOX
Postpartum Note-  Section POD#2    Allergies: IV Contrast (Hives; Rash)    Blood type: O positive  Rubella: Immune  RPR: Negative     S: Patient is a 28y  POD#2 s/p pLTCS.    Patient w/o complaints, pain is controlled.  Pt is OOB, tolerating PO, passing flatus, and voiding. Lochia WNL.   She denies dizziness, SOB, CP.    O:  Vital Signs Last 24 Hrs  T(C): 36.6 (2023 05:54), Max: 36.7 (10 Nov 2023 08:53)  T(F): 97.8 (2023 05:54), Max: 98.1 (10 Nov 2023 08:53)  HR: 84 (2023 05:54) (75 - 93)  BP: 99/67 (2023 05:54) (90/55 - 99/67)  BP(mean): --  RR: 18 (2023 05:54) (16 - 18)  SpO2: 98% (2023 05:54) (98% - 99%)    Parameters below as of 2023 05:54  Patient On (Oxygen Delivery Method): room air    Gen: NAD  Abdomen: Soft, nontender, non distended, fundus firm.  Incision: Clean, dry, and intact.  Negative erythema/edema/ecchymosis.  SubQ  Lochia WNL  Ext: Neg edema, Neg calf tenderness    LABS:                          9.9    23.98 )-----------( 193      ( 10 Nov 2023 09:12 )             30.1       A/P:  28y POD#2 s/p pLTCS, doing well.      PMHx: Right BBB-follows with Dr. Saleh with normal echo  Current Issues: Anemia due to acute blood loss-pt asx and VSS    Increase OOB  PO Pain Protocol  C/w Iron/Vitamin C  Continue Regular Diet  Continue routine post op care    Desiree Sosa PA-C         Postpartum Note-  Section POD#2    Allergies: IV Contrast (Hives; Rash)    Blood type: O positive  Rubella: Immune  RPR: Negative     S: Patient is a 28y  POD#2 s/p pLTCS.    Patient w/o complaints, pain is controlled. Pt is OOB, tolerating PO, passing flatus, and voiding. Lochia WNL.   She denies dizziness, SOB, CP.  Patient states her legs feel tight and more swollen than usual.     O:  Vital Signs Last 24 Hrs  T(C): 36.6 (2023 05:54), Max: 36.7 (10 Nov 2023 08:53)  T(F): 97.8 (2023 05:54), Max: 98.1 (10 Nov 2023 08:53)  HR: 84 (2023 05:54) (75 - 93)  BP: 99/67 (2023 05:54) (90/55 - 99/67)  BP(mean): --  RR: 18 (2023 05:54) (16 - 18)  SpO2: 98% (2023 05:54) (98% - 99%)    Parameters below as of 2023 05:54  Patient On (Oxygen Delivery Method): room air    Gen: NAD  Abdomen: Soft, nontender, non distended, fundus firm.  Incision: Clean, dry, and intact.  Negative erythema/edema/ecchymosis.  SubQ  Lochia WNL  Ext: Mild edema to b/l LE, Neg calf tenderness, Neg lalit's sign B/L    LABS:                          9.9    23.98 )-----------( 193      ( 10 Nov 2023 09:12 )             30.1       A/P:  28y POD#2 s/p pLTCS, doing well.      PMHx: Right BBB-follows with Dr. Saleh with normal echo  Current Issues: Anemia due to acute blood loss-pt asx and VSS    Increase OOB  PO Pain Protocol  C/w Iron/Vitamin C  Continue Regular Diet  Continue routine post op care    Desiree Sosa PA-C         Postpartum Note-  Section POD#2    Allergies: IV Contrast (Hives; Rash)    Blood type: O positive  Rubella: Immune  RPR: Negative     S: Patient is a 28y  POD#2 s/p pLTCS.    Patient w/o complaints, pain is controlled. Pt is OOB, tolerating PO, passing flatus, and voiding. Lochia WNL.   She denies dizziness, SOB, CP.  Patient states her legs feel tight and more swollen than usual.     O:  Vital Signs Last 24 Hrs  T(C): 36.6 (2023 05:54), Max: 36.7 (10 Nov 2023 08:53)  T(F): 97.8 (2023 05:54), Max: 98.1 (10 Nov 2023 08:53)  HR: 84 (2023 05:54) (75 - 93)  BP: 99/67 (2023 05:54) (90/55 - 99/67)  BP(mean): --  RR: 18 (2023 05:54) (16 - 18)  SpO2: 98% (2023 05:54) (98% - 99%)    Parameters below as of 2023 05:54  Patient On (Oxygen Delivery Method): room air    Gen: NAD  Abdomen: Soft, nontender, non distended, fundus firm.  Incision: Clean, dry, and intact.  Negative erythema/edema/ecchymosis.  SubQ  Lochia WNL  Ext: Mild edema to b/l LE, No redness or calf tenderness, Neg lalit's sign B/L    LABS:                          9.9    23.98 )-----------( 193      ( 10 Nov 2023 09:12 )             30.1       A/P:  28y POD#2 s/p pLTCS, doing well.      PMHx: Right BBB-follows with Dr. Saleh with normal echo  Current Issues: Anemia due to acute blood loss-pt asx and VSS    Increase OOB  PO Pain Protocol  C/w Iron/Vitamin C  Continue Regular Diet  Continue routine post op care    Desiree Sosa PA-C

## 2023-11-11 NOTE — PROVIDER CONTACT NOTE (OTHER) - SITUATION
Patient states felt sudden sharp stabbing pain in the upper left quadrant. Having some trouble breathing and taking in deep breaths.

## 2023-11-11 NOTE — PROGRESS NOTE ADULT - NS ATTEND AMEND GEN_ALL_CORE FT
Patient seen at bedside. She is feeling well this morning. Is more sore near incision but is ambulating without difficulty. Had noticed more swelling in bl LE. Lochia normal.    VS and labs reviewed  No clinical signs of infection  abd soft, appropriately tender, incision c/d/i  LE bilaterally swollen and symmetric appearing to level of knees. Overall swelling noted.    We discussed physiologic changes in PP period and post c/s that may contribute to generalized swelling. Has approriate VTE ppx and low suspicion for DVT. Advised elevation, compression stocking, continued ambulation. Return precautions provided.    Will d/c tomorrow as late delivery.    SONIA Flores MD

## 2023-11-12 ENCOUNTER — TRANSCRIPTION ENCOUNTER (OUTPATIENT)
Age: 28
End: 2023-11-12

## 2023-11-12 VITALS
RESPIRATION RATE: 18 BRPM | HEART RATE: 94 BPM | DIASTOLIC BLOOD PRESSURE: 74 MMHG | OXYGEN SATURATION: 99 % | SYSTOLIC BLOOD PRESSURE: 112 MMHG | TEMPERATURE: 99 F

## 2023-11-12 PROCEDURE — 88307 TISSUE EXAM BY PATHOLOGIST: CPT

## 2023-11-12 PROCEDURE — 59050 FETAL MONITOR W/REPORT: CPT

## 2023-11-12 PROCEDURE — 86780 TREPONEMA PALLIDUM: CPT

## 2023-11-12 PROCEDURE — 86900 BLOOD TYPING SEROLOGIC ABO: CPT

## 2023-11-12 PROCEDURE — 86850 RBC ANTIBODY SCREEN: CPT

## 2023-11-12 PROCEDURE — 85025 COMPLETE CBC W/AUTO DIFF WBC: CPT

## 2023-11-12 PROCEDURE — C1889: CPT

## 2023-11-12 PROCEDURE — 59025 FETAL NON-STRESS TEST: CPT

## 2023-11-12 PROCEDURE — 86901 BLOOD TYPING SEROLOGIC RH(D): CPT

## 2023-11-12 RX ORDER — IBUPROFEN 200 MG
1 TABLET ORAL
Qty: 0 | Refills: 0 | DISCHARGE
Start: 2023-11-12

## 2023-11-12 RX ORDER — ASCORBIC ACID 60 MG
1 TABLET,CHEWABLE ORAL
Qty: 0 | Refills: 0 | DISCHARGE
Start: 2023-11-12

## 2023-11-12 RX ORDER — ACETAMINOPHEN 500 MG
3 TABLET ORAL
Qty: 0 | Refills: 0 | DISCHARGE
Start: 2023-11-12

## 2023-11-12 RX ORDER — OXYCODONE HYDROCHLORIDE 5 MG/1
1 TABLET ORAL
Qty: 0 | Refills: 0 | DISCHARGE
Start: 2023-11-12

## 2023-11-12 RX ORDER — LANOLIN
1 OINTMENT (GRAM) TOPICAL
Qty: 0 | Refills: 0 | DISCHARGE
Start: 2023-11-12

## 2023-11-12 RX ORDER — OXYCODONE HYDROCHLORIDE 5 MG/1
1 TABLET ORAL
Qty: 12 | Refills: 0
Start: 2023-11-12 | End: 2023-11-15

## 2023-11-12 RX ORDER — SENNA PLUS 8.6 MG/1
2 TABLET ORAL
Qty: 0 | Refills: 0 | DISCHARGE
Start: 2023-11-12

## 2023-11-12 RX ORDER — FERROUS SULFATE 325(65) MG
1 TABLET ORAL
Qty: 0 | Refills: 0 | DISCHARGE
Start: 2023-11-12

## 2023-11-12 RX ADMIN — Medication 600 MILLIGRAM(S): at 15:04

## 2023-11-12 RX ADMIN — Medication 600 MILLIGRAM(S): at 10:30

## 2023-11-12 RX ADMIN — Medication 325 MILLIGRAM(S): at 15:04

## 2023-11-12 RX ADMIN — Medication 325 MILLIGRAM(S): at 05:16

## 2023-11-12 RX ADMIN — Medication 975 MILLIGRAM(S): at 00:45

## 2023-11-12 RX ADMIN — Medication 500 MILLIGRAM(S): at 05:16

## 2023-11-12 RX ADMIN — Medication 500 MILLIGRAM(S): at 15:04

## 2023-11-12 RX ADMIN — HEPARIN SODIUM 5000 UNIT(S): 5000 INJECTION INTRAVENOUS; SUBCUTANEOUS at 09:56

## 2023-11-12 RX ADMIN — Medication 600 MILLIGRAM(S): at 09:33

## 2023-11-12 RX ADMIN — Medication 975 MILLIGRAM(S): at 13:10

## 2023-11-12 RX ADMIN — Medication 600 MILLIGRAM(S): at 16:00

## 2023-11-12 RX ADMIN — Medication 975 MILLIGRAM(S): at 05:16

## 2023-11-12 RX ADMIN — Medication 975 MILLIGRAM(S): at 14:00

## 2023-11-12 RX ADMIN — Medication 975 MILLIGRAM(S): at 06:00

## 2023-11-12 NOTE — DISCHARGE NOTE OB - NS MD DC FALL RISK RISK
For information on Fall & Injury Prevention, visit: [email or web address] OR  [email or web address] OR  https://www.cdc.gov/steadi/patient.html

## 2023-11-12 NOTE — DISCHARGE NOTE OB - CARE PROVIDERS DIRECT ADDRESSES
[email or web address]

## 2023-11-12 NOTE — PROGRESS NOTE ADULT - SUBJECTIVE AND OBJECTIVE BOX
Postpartum Note-  Section POD#3    Allergies  IV Contrast (Hives; Rash)    Subjective: Patient w/o complaints, pain is controlled.  Pt is OOB, tolerating PO, passing flatus, and voiding. Lochia WNL.     O:  Vital Signs Last 24 Hrs  T(C): 36.6 (2023 06:08), Max: 36.6 (2023 06:08)  T(F): 97.8 (2023 06:08), Max: 97.8 (2023 06:08)  HR: 96 (2023 06:08) (96 - 111)  BP: 109/71 (2023 06:08) (106/69 - 111/71)  BP(mean): 84 (2023 21:15) (84 - 84)  RR: 18 (2023 06:08) (17 - 22)  SpO2: 99% (2023 06:08) (99% - 100%)    Parameters below as of 2023 06:08  Patient On (Oxygen Delivery Method): room air          Gen: NAD  Heart: S1S2 RRR  Lungs: CTA b/l  Abdomen: Soft, nontender, non distended, fundus firm.  Incision: Clean, dry, and intact.  Negative erythema/edema/ecchymosis.   SubQ  Lochia WNL  Ext: Neg edema, Neg calf tenderness    LABS:               9.9    23.98 )-----------( 193      ( 11-10 @ 09:12 )             30.1         PAST MEDICAL & SURGICAL HISTORY:  Tonsillitis      Heart palpitations      S/P tonsillectomy        Current Issues: none

## 2023-11-12 NOTE — DISCHARGE NOTE OB - MEDICATION SUMMARY - MEDICATIONS TO TAKE
I will START or STAY ON the medications listed below when I get home from the hospital:    ibuprofen 600 mg oral tablet  -- 1 tab(s) by mouth every 6 hours  -- Indication: For pain    acetaminophen 325 mg oral tablet  -- 3 tab(s) by mouth 3 times a day  -- Indication: For pain    oxyCODONE 5 mg oral tablet  -- 1 tab(s) by mouth every 3 hours As needed Moderate to Severe Pain (4-10)  -- Indication: For pain    lanolin topical ointment  -- 1 Apply on skin to affected area every 6 hours As needed Sore Nipples  -- Indication: For nipple soreness    ferrous sulfate 325 mg (65 mg elemental iron) oral tablet  -- 1 tab(s) by mouth 3 times a day  -- Indication: For Anemia    senna leaf extract oral tablet  -- 2 tab(s) by mouth once a day (at bedtime)  -- Indication: For Constipation    ascorbic acid 500 mg oral tablet  -- 1 tab(s) by mouth 3 times a day  -- Indication: For Anemia

## 2023-11-12 NOTE — DISCHARGE NOTE OB - NSTOBACCONEVERSMOKERY/N_GEN_A
The ABCs of the Annual Wellness Visit  Subsequent Medicare Wellness Visit    Subjective      Derrick Francisco is a 73 y.o. male who presents for a Subsequent Medicare Wellness Visit.    The following portions of the patient's history were reviewed and   updated as appropriate: allergies, current medications, past family history, past medical history, past social history, past surgical history, and problem list.    Compared to one year ago, the patient feels his physical   health is the same.    Compared to one year ago, the patient feels his mental   health is the same.    Recent Hospitalizations:  He was not admitted to the hospital during the last year.       Current Medical Providers:  Patient Care Team:  Neeraj Lake MD as PCP - General (General Practice)    Outpatient Medications Prior to Visit   Medication Sig Dispense Refill    cloNIDine (CATAPRES) 0.2 MG tablet Take 1 tablet by mouth 2 (Two) Times a Day. 60 tablet 11    hydroCHLOROthiazide (HYDRODIURIL) 25 MG tablet Take 1 tablet by mouth Daily. 90 tablet 3    levothyroxine (SYNTHROID, LEVOTHROID) 100 MCG tablet TAKE 1 TABLET DAILY 90 tablet 1    valsartan (Diovan) 320 MG tablet Take 1 tablet by mouth Daily. 90 tablet 3    verapamil SR (CALAN-SR) 240 MG CR tablet TAKE 1 TABLET TWICE A  tablet 3    atorvastatin (LIPITOR) 10 MG tablet Take 1 tablet by mouth Daily. 90 tablet 3    folic acid (FOLVITE) 1 MG tablet Take 1 tablet by mouth Daily. 90 tablet 3    potassium chloride (K-DUR,KLOR-CON) 20 MEQ CR tablet Take 1 tablet by mouth 2 (Two) Times a Day. 60 tablet 3    vitamin D (ERGOCALCIFEROL) 1.25 MG (32104 UT) capsule capsule Take 1 capsule by mouth Every 14 (Fourteen) Days. 6 capsule 2     No facility-administered medications prior to visit.       No opioid medication identified on active medication list. I have reviewed chart for other potential  high risk medication/s and harmful drug interactions in the elderly.        Aspirin is not on active  "medication list.  Aspirin use is not indicated based on review of current medical condition/s. Risk of harm outweighs potential benefits.  .    Patient Active Problem List   Diagnosis    Hyperlipidemia    Hypothyroidism    Vitamin deficiency    Vitamin D deficiency    Essential hypertension    Hypokalemia    Morbidly obese     Advance Care Planning   Advance Care Planning     Advance Directive is not on file.  ACP discussion was declined by the patient. Patient does not have an advance directive, declines further assistance.     Objective    Vitals:    23 1053   BP: 112/68   Pulse: 64   Temp: 98.7 °F (37.1 °C)   TempSrc: Temporal   SpO2: 100%   Weight: 111 kg (244 lb 6.4 oz)   Height: 172.7 cm (68\")   PainSc:   4   PainLoc: Comment: head     Estimated body mass index is 37.16 kg/m² as calculated from the following:    Height as of this encounter: 172.7 cm (68\").    Weight as of this encounter: 111 kg (244 lb 6.4 oz).    Class 2 Severe Obesity (BMI >=35 and <=39.9). Obesity-related health conditions include the following: hypertension. Obesity is unchanged. BMI is is above average; BMI management plan is completed. We discussed portion control and increasing exercise.      Does the patient have evidence of cognitive impairment?   NA            HEALTH RISK ASSESSMENT    Smoking Status:  Social History     Tobacco Use   Smoking Status Never   Smokeless Tobacco Never     Alcohol Consumption:  Social History     Substance and Sexual Activity   Alcohol Use No     Fall Risk Screen:    STEADI Fall Risk Assessment was completed, and patient is at LOW risk for falls.Assessment completed on:2023    Depression Screening:       No data to display                Health Habits and Functional and Cognitive Screenin/18/2023    10:48 AM   Functional & Cognitive Status   Do you have difficulty preparing food and eating? No   Do you have difficulty bathing yourself, getting dressed or grooming yourself? No   Do you " have difficulty using the toilet? No   Do you have difficulty moving around from place to place? No   Do you have trouble with steps or getting out of a bed or a chair? No   Current Diet Well Balanced Diet   Dental Exam Up to date   Eye Exam Up to date   Exercise (times per week) 3 times per week        Exercise Comment [employer]   Do you need help using the phone?  Yes   Are you deaf or do you have serious difficulty hearing?  Yes   Do you need help to go to places out of walking distance? Yes   Do you need help shopping? Yes   Do you need help preparing meals?  Yes   Do you need help with housework?  No   Do you need help with laundry? Yes   Do you need help taking your medications? No   Do you need help managing money? Yes   Do you ever drive or ride in a car without wearing a seat belt? No   Who do you live with? Alone   If you need help, do you have trouble finding someone available to you? No       Age-appropriate Screening Schedule:  Refer to the list below for future screening recommendations based on patient's age, sex and/or medical conditions. Orders for these recommended tests are listed in the plan section. The patient has been provided with a written plan.    Health Maintenance   Topic Date Due    INFLUENZA VACCINE  08/01/2023    COVID-19 Vaccine (4 - 2023-24 season) 09/01/2023    LIPID PANEL  02/09/2024    ANNUAL WELLNESS VISIT  09/18/2024    BMI FOLLOWUP  09/18/2024    TDAP/TD VACCINES (2 - Td or Tdap) 06/12/2027    COLORECTAL CANCER SCREENING  06/12/2027    HEPATITIS C SCREENING  Completed    Pneumococcal Vaccine 65+  Completed    DIABETIC FOOT EXAM  Discontinued    HEMOGLOBIN A1C  Discontinued    DIABETIC EYE EXAM  Discontinued    URINE MICROALBUMIN  Discontinued    ZOSTER VACCINE  Discontinued                  CMS Preventative Services Quick Reference  Risk Factors Identified During Encounter:    Inactivity/Sedentary: Patient was advised to exercise at least 150 minutes a week per CDC  recommendations.    The above risks/problems have been discussed with the patient.  Pertinent information has been shared with the patient in the After Visit Summary.    Diagnoses and all orders for this visit:    1. Wellness examination (Primary)    2. Essential hypertension  -     potassium chloride (K-DUR,KLOR-CON) 20 MEQ CR tablet; Take 1 tablet by mouth 2 (Two) Times a Day.  Dispense: 60 tablet; Refill: 3        Follow Up:   Next Medicare Wellness visit to be scheduled in 1 year.      An After Visit Summary and PPPS were made available to the patient.             No

## 2023-11-12 NOTE — DISCHARGE NOTE OB - MEDICATION SUMMARY - MEDICATIONS TO STOP TAKING
I will STOP taking the medications listed below when I get home from the hospital:    amoxicillin-clavulanate 875 mg-125 mg oral tablet  -- 875 milligram(s) by mouth every 12 hours    nitrofurantoin macrocrystals 100 mg oral capsule  -- 1 cap(s) by mouth every 12 hours MDD: 2 tabs

## 2023-11-12 NOTE — DISCHARGE NOTE OB - CARE PLAN
1 Principal Discharge DX:	 delivery delivered  Assessment and plan of treatment:	pelvic rest x 6 weeks   incision check in 2 weeks

## 2023-11-12 NOTE — DISCHARGE NOTE OB - PATIENT PORTAL LINK FT
You can access the FollowMyHealth Patient Portal offered by [facility] by registering at the following website: [email or web address]. By joining [employer]’s FollowMyHealth portal, you will also be able to view your health information using other applications (apps) compatible with our system.

## 2023-11-12 NOTE — PROGRESS NOTE ADULT - PROBLEM SELECTOR PLAN 1
Increase OOB  PO Pain Protocol  Continue Regular Diet  Continue routine post op care
Increase OOB  DVT ppx  Dressing removed  Jean catheter to be removed this AM and then due to void  Regular diet  F/u AM CBC  C/w IVF bolus  Continue routine post op care and pain protoco
Increase OOB  PO Pain Protocol  Continue Regular Diet  Continue routine prenatal care
numerical 0-10

## 2023-11-12 NOTE — DISCHARGE NOTE OB - CARE PROVIDER_API CALL
Juan Nichole  Obstetrics and Gynecology  [address] [place] [address], [facility] [address]  [place] [address]  Phone: [phone]  Fax: [phone]  Follow Up Time:

## 2023-11-12 NOTE — DISCHARGE NOTE OB - MATERIALS PROVIDED
show
Breastfeeding Log/Guide to Postpartum Care/[facility] Hearing Screen Program/Shaken Baby Prevention Handout/Breastfeeding Guide and Packet/Birth Certificate Instructions

## 2023-11-12 NOTE — PROGRESS NOTE ADULT - ATTENDING COMMENTS
OB Attg:  Pt seen and assessed. I agree with above assessment and plan.   ANDRIY Nichole MD
OB Attg:  Pt seen and assessed. I agree with above assessment and plan.  ANDRIY Nichole MD

## 2023-11-12 NOTE — PROGRESS NOTE ADULT - PROBLEM SELECTOR PLAN 2
C/w Iron/Vitamin C  Continue to monitor for signs/symptoms of anemia
continue iron/vitamin C  monitor for signs and symptoms of anemia  repeat labs prn

## 2023-11-12 NOTE — DISCHARGE NOTE OB - NPI NUMBER (FOR [employer] USE ONLY) :
Post-Care Instructions: I reviewed with the patient in detail post-care instructions. Patient is to wear sunprotection, and avoid picking at any of the treated lesions. Pt may apply Vaseline to crusted or scabbing areas. Include Z78.9 (Other Specified Conditions Influencing Health Status) As An Associated Diagnosis?: No Show Aperture Variable?: Yes Medical Necessity Clause: This procedure was medically necessary because the lesions that were treated were: Medical Necessity Information: It is in your best interest to select a reason for this procedure from the list below. All of these items fulfill various CMS LCD requirements except the new and changing color options. Detail Level: Detailed Consent: The patient's consent was obtained including but not limited to risks of crusting, scabbing, blistering, scarring, darker or lighter pigmentary change, recurrence, incomplete removal and infection. [[id number]]

## 2023-11-13 ENCOUNTER — APPOINTMENT (OUTPATIENT)
Dept: OBGYN | Facility: CLINIC | Age: 28
End: 2023-11-13

## 2023-11-16 ENCOUNTER — APPOINTMENT (OUTPATIENT)
Age: 28
End: 2023-11-16

## 2023-11-17 ENCOUNTER — NON-APPOINTMENT (OUTPATIENT)
Age: 28
End: 2023-11-17

## 2023-11-21 NOTE — OB RN PATIENT PROFILE - NSSDOHLIVINGSIT_OBGYN_A_OB
Chief Complaint   Patient presents with    Follow-up     Pt is here for cardiac follow up.  Pt denies CP, SOB, dizziness or palpitations.  Pt does take a daily ASA.      Med Refill     Pt request 90 day refills to be sent to [facility].  Medications were verified by the pt.      Lab Work     Pt states their last labs were about 6 months ago with her PCP.         Cardiac Complaints  none      Subjective   Krissy Wesley is a 66 y.o. female with palpitations, HTN, and anxiety. She was referred back to our office in 2022 after not being seen for 20 years for SOA and palpitations. She was urged to undergo cardiac workup to rule out cardiac concerns. Stress was negative for ischemia, but HTN response noted, cozaar added, later changed to lisinopril. Echo showed EF stable with mild MR.  She had called in with tachycardia, BB added, later changed to cardizem due to fatigue. Encouraged to take maxzide daily at last visit.     She comes today for follow up and no new concerns are voiced. CP, SOA, palpitations, dizziness, and syncope denied. Labs with PCP, checked last 6 months ago. Refills requested. Patient admits BP better with everyday use of maxzide.        Cardiac History  Past Surgical History:   Procedure Laterality Date    CARDIOVASCULAR STRESS TEST  02/15/1999    R.Stress- 7 Min. 10.2 METS. 91% THR. BP- 136/92. Negative    CARDIOVASCULAR STRESS TEST  04/06/2022    3 Min.49 Sec. 4.6 METS. 75% THR. BP- 179/70. EF > 70%. Negative    ECHO - CONVERTED  04/06/2022    EF 65%. Mild MR & AI       Current Outpatient Medications   Medication Sig Dispense Refill    dilTIAZem CD (Cardizem CD) 180 MG 24 hr capsule Take 1 capsule by mouth Daily. 90 capsule 3    lisinopril (PRINIVIL,ZESTRIL) 5 MG tablet Take 1 tablet by mouth Daily. 90 tablet 3    triamterene-hydrochlorothiazide (MAXZIDE) 75-50 MG per tablet Take 1 tablet by mouth Daily. 90 tablet 2    albuterol sulfate  (90 Base) MCG/ACT inhaler Inhale 2 puffs Every 6  (Six) Hours As Needed for Wheezing.      Aspirin Adult Low Strength 81 MG EC tablet TAKE ONE TABLET BY MOUTH DAILY 30 tablet 6    busPIRone (BUSPAR) 15 MG tablet Take 1 tablet by mouth 3 (Three) Times a Day.      EPINEPHrine (EPIPEN) 0.3 MG/0.3ML solution auto-injector injection 0.3 mg 1 (One) Time.      montelukast (SINGULAIR) 10 MG tablet Take 1 tablet by mouth Daily.      naproxen (NAPROSYN) 500 MG tablet Take 1 tablet by mouth 2 (Two) Times a Day With Meals.      sertraline (ZOLOFT) 100 MG tablet Take 2 tablets by mouth Daily.      tiotropium (SPIRIVA) 18 MCG per inhalation capsule Place 1 capsule into inhaler and inhale Daily.       No current facility-administered medications for this visit.       Amoxicillin, Azithromycin, Bee venom, and Sulfa antibiotics    Past Medical History:   Diagnosis Date    Acid reflux     Asthma     COPD (chronic obstructive pulmonary disease)     Hypertension     IBS (irritable bowel syndrome)        Social History     Socioeconomic History    Marital status:    Tobacco Use    Smoking status: Former     Packs/day: 1.00     Years: 15.00     Additional pack years: 0.00     Total pack years: 15.00     Types: Cigarettes     Quit date: 3/4/2023     Years since quittin.7    Tobacco comments:     Quit smoking 2023   Vaping Use    Vaping Use: Never used   Substance and Sexual Activity    Alcohol use: Never    Drug use: Never    Sexual activity: Never       Family History   Problem Relation Age of Onset    Hypertension Mother     Cancer Mother     Heart attack Father     Hypertension Father        Review of Systems   Constitutional: Negative for malaise/fatigue and night sweats.   Cardiovascular:  Negative for chest pain, claudication, dyspnea on exertion, irregular heartbeat, leg swelling, near-syncope, orthopnea, palpitations and syncope.   Respiratory:  Negative for cough, shortness of breath and wheezing.    Musculoskeletal:  Negative for back pain, joint pain and  "stiffness.   Gastrointestinal:  Negative for anorexia, heartburn, nausea and vomiting.   Genitourinary:  Negative for dysuria, hematuria, hesitancy and nocturia.   Neurological:  Negative for dizziness, light-headedness and loss of balance.   Psychiatric/Behavioral:  Negative for depression and memory loss. The patient is not nervous/anxious.            Objective     /80 (BP Location: Left arm, Patient Position: Sitting)   Pulse 78   Ht 167.6 cm (66\")   Wt 62.5 kg (137 lb 12.8 oz)   BMI 22.24 kg/m²     Constitutional:       Appearance: Not in distress.   Eyes:      Pupils: Pupils are equal, round, and reactive to light.   HENT:      Nose: Nose normal.   Pulmonary:      Effort: Pulmonary effort is normal.      Breath sounds: Normal breath sounds.   Cardiovascular:      PMI at left midclavicular line. Normal rate. Regular rhythm.      Murmurs: There is a systolic murmur.   Abdominal:      Palpations: Abdomen is soft.   Musculoskeletal: Normal range of motion.      Cervical back: Normal range of motion and neck supple. Skin:     General: Skin is warm and dry.   Neurological:      Mental Status: Alert.         Procedures         Diagnoses and all orders for this visit:    1. Palpitations (Primary)    2. Primary hypertension    3. Former smoker    Other orders  -     lisinopril (PRINIVIL,ZESTRIL) 5 MG tablet; Take 1 tablet by mouth Daily.  Dispense: 90 tablet; Refill: 3  -     triamterene-hydrochlorothiazide (MAXZIDE) 75-50 MG per tablet; Take 1 tablet by mouth Daily.  Dispense: 90 tablet; Refill: 2  -     dilTIAZem CD (Cardizem CD) 180 MG 24 hr capsule; Take 1 capsule by mouth Daily.  Dispense: 90 capsule; Refill: 3             Palpitations: On cardizem therapy. Palpitations are denied. She was urged to limit caffeine intake and increase fluids.     HTN: BP is now stable, taking maxzide daily, will continue. Will continue lisinopril and cardizem at same.     Cardiac status: Stable today. On ASA without " concerns. No bleed or bruise reported. Most recent workup showed no ischemia, or LV dysfunction.     COPD: On inhaler therapy. She tolerates well. No worsening SOA noted.      Tobacco abuse: Remains tobacco free!     Refills per request.      BMI noted at 22.24, good cardiac diet and walking regimen urged.      6 month follow up advised, or sooner if needed.         Problems Addressed this Visit          Cardiac and Vasculature    Palpitations - Primary     Other Visit Diagnoses       Primary hypertension        Relevant Medications    lisinopril (PRINIVIL,ZESTRIL) 5 MG tablet    triamterene-hydrochlorothiazide (MAXZIDE) 75-50 MG per tablet    dilTIAZem CD (Cardizem CD) 180 MG 24 hr capsule    Former smoker              Diagnoses         Codes Comments    Palpitations    -  Primary ICD-10-CM: R00.2  ICD-9-CM: 785.1     Primary hypertension     ICD-10-CM: I10  ICD-9-CM: 401.9     Former smoker     ICD-10-CM: Z87.891  ICD-9-CM: V15.82             BMI is within normal parameters. No other follow-up for BMI required.                Electronically signed by Shonna Stephens, MINO November 21, 2023 11:42 EST         I have a steady place to live

## 2023-11-22 ENCOUNTER — APPOINTMENT (OUTPATIENT)
Age: 28
End: 2023-11-22

## 2023-11-22 ENCOUNTER — APPOINTMENT (OUTPATIENT)
Dept: OBGYN | Facility: CLINIC | Age: 28
End: 2023-11-22
Payer: COMMERCIAL

## 2023-11-22 VITALS
SYSTOLIC BLOOD PRESSURE: 103 MMHG | HEART RATE: 88 BPM | HEIGHT: 60 IN | BODY MASS INDEX: 29.64 KG/M2 | WEIGHT: 151 LBS | DIASTOLIC BLOOD PRESSURE: 68 MMHG

## 2023-11-22 DIAGNOSIS — R10.2 PELVIC AND PERINEAL PAIN: ICD-10-CM

## 2023-11-22 DIAGNOSIS — Z87.42 PERSONAL HISTORY OF OTHER DISEASES OF THE FEMALE GENITAL TRACT: ICD-10-CM

## 2023-11-22 DIAGNOSIS — R39.9 UNSPECIFIED SYMPTOMS AND SIGNS INVOLVING THE GENITOURINARY SYSTEM: ICD-10-CM

## 2023-11-22 DIAGNOSIS — Z34.00 ENCOUNTER FOR SUPERVISION OF NORMAL FIRST PREGNANCY, UNSPECIFIED TRIMESTER: ICD-10-CM

## 2023-11-22 DIAGNOSIS — B37.9 CANDIDIASIS, UNSPECIFIED: ICD-10-CM

## 2023-11-22 DIAGNOSIS — O99.413 DISEASES OF THE CIRCULATORY SYSTEM COMPLICATING PREGNANCY, THIRD TRIMESTER: ICD-10-CM

## 2023-11-22 DIAGNOSIS — O99.412 DISEASES OF THE CIRCULATORY SYSTEM COMPLICATING PREGNANCY, SECOND TRIMESTER: ICD-10-CM

## 2023-11-22 DIAGNOSIS — I25.10 DISEASES OF THE CIRCULATORY SYSTEM COMPLICATING PREGNANCY, THIRD TRIMESTER: ICD-10-CM

## 2023-11-22 DIAGNOSIS — R10.30 OTHER SPECIFIED DISEASES AND CONDITIONS COMPLICATING PREGNANCY: ICD-10-CM

## 2023-11-22 DIAGNOSIS — O99.891 OTHER SPECIFIED DISEASES AND CONDITIONS COMPLICATING PREGNANCY: ICD-10-CM

## 2023-11-22 DIAGNOSIS — O40.3XX0 POLYHYDRAMNIOS, THIRD TRIMESTER, NOT APPLICABLE OR UNSPECIFIED: ICD-10-CM

## 2023-11-22 DIAGNOSIS — I25.10 DISEASES OF THE CIRCULATORY SYSTEM COMPLICATING PREGNANCY, SECOND TRIMESTER: ICD-10-CM

## 2023-11-22 DIAGNOSIS — R00.2 PALPITATIONS: ICD-10-CM

## 2023-11-22 PROCEDURE — 0503F POSTPARTUM CARE VISIT: CPT

## 2023-11-27 ENCOUNTER — APPOINTMENT (OUTPATIENT)
Age: 28
End: 2023-11-27

## 2023-11-27 PROBLEM — O99.891 PREGNANCY WITH SUPRAPUBIC CRAMPING, ANTEPARTUM: Status: RESOLVED | Noted: 2023-05-18 | Resolved: 2023-11-27

## 2023-11-27 PROBLEM — R00.2 INTERMITTENT PALPITATIONS: Status: RESOLVED | Noted: 2023-05-31 | Resolved: 2023-11-27

## 2023-11-27 PROBLEM — R39.9 LOWER URINARY TRACT SYMPTOMS: Status: RESOLVED | Noted: 2023-07-05 | Resolved: 2023-11-27

## 2023-11-27 PROBLEM — O40.3XX0 POLYHYDRAMNIOS IN THIRD TRIMESTER, ANTEPARTUM COMPLICATION: Status: RESOLVED | Noted: 2023-10-18 | Resolved: 2023-11-27

## 2023-11-27 PROBLEM — B37.9 YEAST INFECTION: Status: RESOLVED | Noted: 2023-10-30 | Resolved: 2023-11-27

## 2023-11-27 PROBLEM — O99.412: Status: RESOLVED | Noted: 2023-07-19 | Resolved: 2023-11-27

## 2023-11-27 PROBLEM — Z87.42 HISTORY OF VAGINAL DISCHARGE: Status: RESOLVED | Noted: 2023-10-27 | Resolved: 2023-11-27

## 2023-11-27 PROBLEM — R10.2 PELVIC PAIN: Status: RESOLVED | Noted: 2023-10-06 | Resolved: 2023-11-27

## 2023-11-27 PROBLEM — Z34.00 SUPERVISION OF NORMAL FIRST PREGNANCY, ANTEPARTUM: Status: RESOLVED | Noted: 2023-04-18 | Resolved: 2023-11-27

## 2023-11-27 PROBLEM — O99.413: Status: RESOLVED | Noted: 2023-10-03 | Resolved: 2023-11-27

## 2023-11-28 ENCOUNTER — APPOINTMENT (OUTPATIENT)
Age: 28
End: 2023-11-28

## 2023-11-28 ENCOUNTER — APPOINTMENT (OUTPATIENT)
Age: 28
End: 2023-11-28
Payer: COMMERCIAL

## 2023-11-28 PROCEDURE — S9443: CPT | Mod: 95

## 2023-11-29 ENCOUNTER — NON-APPOINTMENT (OUTPATIENT)
Age: 28
End: 2023-11-29

## 2023-11-29 ENCOUNTER — APPOINTMENT (OUTPATIENT)
Dept: OBGYN | Facility: CLINIC | Age: 28
End: 2023-11-29

## 2023-12-01 ENCOUNTER — APPOINTMENT (OUTPATIENT)
Age: 28
End: 2023-12-01

## 2023-12-04 ENCOUNTER — NON-APPOINTMENT (OUTPATIENT)
Age: 28
End: 2023-12-04

## 2023-12-12 ENCOUNTER — NON-APPOINTMENT (OUTPATIENT)
Age: 28
End: 2023-12-12

## 2023-12-12 LAB
SURGICAL PATHOLOGY STUDY: SIGNIFICANT CHANGE UP
SURGICAL PATHOLOGY STUDY: SIGNIFICANT CHANGE UP

## 2023-12-20 ENCOUNTER — NON-APPOINTMENT (OUTPATIENT)
Age: 28
End: 2023-12-20

## 2023-12-27 ENCOUNTER — APPOINTMENT (OUTPATIENT)
Age: 28
End: 2023-12-27

## 2024-01-03 ENCOUNTER — APPOINTMENT (OUTPATIENT)
Dept: OBGYN | Facility: CLINIC | Age: 29
End: 2024-01-03
Payer: COMMERCIAL

## 2024-01-03 VITALS
SYSTOLIC BLOOD PRESSURE: 113 MMHG | WEIGHT: 143 LBS | BODY MASS INDEX: 28.07 KG/M2 | HEIGHT: 60 IN | DIASTOLIC BLOOD PRESSURE: 76 MMHG

## 2024-01-03 DIAGNOSIS — K62.5 HEMORRHAGE OF ANUS AND RECTUM: ICD-10-CM

## 2024-01-03 PROCEDURE — 0503F POSTPARTUM CARE VISIT: CPT

## 2024-01-03 NOTE — HISTORY OF PRESENT ILLNESS
[Delivery Date: ___] : on [unfilled] [Male] : Delivery History: baby boy [Breastfeeding] : currently nursing [Postpartum Follow Up] : postpartum follow up [Primary C/S] : delivered by  section [NICU: ___] : NICU: [unfilled] [BF with Difficulty] : nursing without difficulty [Resumed Menses] : has not resumed her menses [Resumed [place]] : has not resumed intercourse [Intended Contraception] : the patient does not intended to use contraception postpartum [Abdominal Pain] : no abdominal pain [Back Pain] : no back pain [Breast Pain] : no breast pain [BreastFeeding Problems] : no breastfeeding problems [Chest Pain] : no chest pain [Cracked Nipples] : no cracked nipples [S/Sx PP Depression] : no signs/symptoms of postpartum depression [Heavy Bleeding] : no heavy bleeding [Incisional Drainage] : no incisional drainage [Incisional Pain] : no incisional pain [Irregular Bleeding] : no irregular bleeding [Leg Pain] : no leg pain [Shortness of Breath] : no shortness of breath [Suicidal Ideation] : no suicidal ideation [Vaginal Discharge] : no vaginal discharge [Clean/Dry/Intact] : clean, dry and intact [Erythema] : not erythematous [Swelling] : not swollen [Dehiscence] : not dehisced [Healed] : healed [Back to Normal] : is back to normal in size [Normal] : the vagina was normal [Cervix Sample Taken] : cervical sample not taken for a Pap smear [Not Done] : Examination of breasts not done [Doing Well] : is doing well [No Sign of Infection] : is showing no signs of infection [Fair Pain Control] : has fair pain control [Poor Control] : has poor pain control [None] : None [de-identified] : Dr Mayer [de-identified] : She has been diagnosed with a rectal fissure - saw GI, is being treated with rectal steroid and lidocaine cream. [de-identified] : Symptoms of postpartum anxiety have improved. Pain is adequately controlled on current medication regimen.  [de-identified] : s/p  delivery, now with rectal fissure [de-identified] : CBC and ferritin sent to assess for anemia. Information for [facility] given again. She was given PT referral for diastasis recti and pelvic floor incoordination. RTO in 3-4 months for annual or PRN.

## 2024-01-04 LAB
FERRITIN SERPL-MCNC: 35 NG/ML
HCT VFR BLD CALC: 37.1 %
HGB BLD-MCNC: 12.2 G/DL
MCHC RBC-ENTMCNC: 28.6 PG
MCHC RBC-ENTMCNC: 32.9 GM/DL
MCV RBC AUTO: 87.1 FL
PLATELET # BLD AUTO: 303 K/UL
RBC # BLD: 4.26 M/UL
RBC # FLD: 12.5 %
WBC # FLD AUTO: 9.61 K/UL

## 2024-01-08 ENCOUNTER — APPOINTMENT (OUTPATIENT)
Dept: CARDIOLOGY | Facility: CLINIC | Age: 29
End: 2024-01-08

## 2024-02-02 ENCOUNTER — APPOINTMENT (OUTPATIENT)
Age: 29
End: 2024-02-02

## 2024-02-09 ENCOUNTER — NON-APPOINTMENT (OUTPATIENT)
Age: 29
End: 2024-02-09

## 2024-04-11 ENCOUNTER — APPOINTMENT (OUTPATIENT)
Dept: OBGYN | Facility: CLINIC | Age: 29
End: 2024-04-11

## 2024-05-04 ENCOUNTER — EMERGENCY (EMERGENCY)
Facility: HOSPITAL | Age: 29
LOS: 0 days | Discharge: HOME | End: 2024-05-04
Attending: STUDENT IN AN ORGANIZED HEALTH CARE EDUCATION/TRAINING PROGRAM
Payer: COMMERCIAL

## 2024-05-04 VITALS
DIASTOLIC BLOOD PRESSURE: 68 MMHG | SYSTOLIC BLOOD PRESSURE: 126 MMHG | WEIGHT: 119.93 LBS | TEMPERATURE: 99 F | OXYGEN SATURATION: 99 % | HEART RATE: 96 BPM | RESPIRATION RATE: 20 BRPM

## 2024-05-04 DIAGNOSIS — R68.83 CHILLS (WITHOUT FEVER): ICD-10-CM

## 2024-05-04 DIAGNOSIS — R42 DIZZINESS AND GIDDINESS: ICD-10-CM

## 2024-05-04 DIAGNOSIS — R07.89 OTHER CHEST PAIN: ICD-10-CM

## 2024-05-04 DIAGNOSIS — R11.0 NAUSEA: ICD-10-CM

## 2024-05-04 DIAGNOSIS — R53.1 WEAKNESS: ICD-10-CM

## 2024-05-04 DIAGNOSIS — R00.2 PALPITATIONS: ICD-10-CM

## 2024-05-04 DIAGNOSIS — Z91.041 RADIOGRAPHIC DYE ALLERGY STATUS: ICD-10-CM

## 2024-05-04 DIAGNOSIS — Z20.822 CONTACT WITH AND (SUSPECTED) EXPOSURE TO COVID-19: ICD-10-CM

## 2024-05-04 DIAGNOSIS — Z98.89 OTHER SPECIFIED POSTPROCEDURAL STATES: Chronic | ICD-10-CM

## 2024-05-04 DIAGNOSIS — R00.0 TACHYCARDIA, UNSPECIFIED: ICD-10-CM

## 2024-05-04 LAB
ALBUMIN SERPL ELPH-MCNC: 4.6 G/DL — SIGNIFICANT CHANGE UP (ref 3.5–5.2)
ALP SERPL-CCNC: 113 U/L — SIGNIFICANT CHANGE UP (ref 30–115)
ALT FLD-CCNC: 14 U/L — SIGNIFICANT CHANGE UP (ref 0–41)
ANION GAP SERPL CALC-SCNC: 12 MMOL/L — SIGNIFICANT CHANGE UP (ref 7–14)
APPEARANCE UR: CLEAR — SIGNIFICANT CHANGE UP
AST SERPL-CCNC: 14 U/L — SIGNIFICANT CHANGE UP (ref 0–41)
BASOPHILS # BLD AUTO: 0.04 K/UL — SIGNIFICANT CHANGE UP (ref 0–0.2)
BASOPHILS NFR BLD AUTO: 0.6 % — SIGNIFICANT CHANGE UP (ref 0–1)
BILIRUB SERPL-MCNC: 0.2 MG/DL — SIGNIFICANT CHANGE UP (ref 0.2–1.2)
BILIRUB UR-MCNC: NEGATIVE — SIGNIFICANT CHANGE UP
BUN SERPL-MCNC: 22 MG/DL — HIGH (ref 10–20)
CALCIUM SERPL-MCNC: 8.6 MG/DL — SIGNIFICANT CHANGE UP (ref 8.4–10.5)
CHLORIDE SERPL-SCNC: 103 MMOL/L — SIGNIFICANT CHANGE UP (ref 98–110)
CO2 SERPL-SCNC: 25 MMOL/L — SIGNIFICANT CHANGE UP (ref 17–32)
COLOR SPEC: YELLOW — SIGNIFICANT CHANGE UP
CREAT SERPL-MCNC: 0.8 MG/DL — SIGNIFICANT CHANGE UP (ref 0.7–1.5)
DIFF PNL FLD: NEGATIVE — SIGNIFICANT CHANGE UP
EGFR: 102 ML/MIN/1.73M2 — SIGNIFICANT CHANGE UP
EOSINOPHIL # BLD AUTO: 0.03 K/UL — SIGNIFICANT CHANGE UP (ref 0–0.7)
EOSINOPHIL NFR BLD AUTO: 0.4 % — SIGNIFICANT CHANGE UP (ref 0–8)
FLUAV AG NPH QL: SIGNIFICANT CHANGE UP
FLUBV AG NPH QL: SIGNIFICANT CHANGE UP
GLUCOSE SERPL-MCNC: 103 MG/DL — HIGH (ref 70–99)
GLUCOSE UR QL: NEGATIVE MG/DL — SIGNIFICANT CHANGE UP
HCT VFR BLD CALC: 36.2 % — LOW (ref 37–47)
HGB BLD-MCNC: 12.1 G/DL — SIGNIFICANT CHANGE UP (ref 12–16)
IMM GRANULOCYTES NFR BLD AUTO: 0.1 % — SIGNIFICANT CHANGE UP (ref 0.1–0.3)
KETONES UR-MCNC: NEGATIVE MG/DL — SIGNIFICANT CHANGE UP
LEUKOCYTE ESTERASE UR-ACNC: NEGATIVE — SIGNIFICANT CHANGE UP
LYMPHOCYTES # BLD AUTO: 1.37 K/UL — SIGNIFICANT CHANGE UP (ref 1.2–3.4)
LYMPHOCYTES # BLD AUTO: 19.2 % — LOW (ref 20.5–51.1)
MAGNESIUM SERPL-MCNC: 2 MG/DL — SIGNIFICANT CHANGE UP (ref 1.8–2.4)
MCHC RBC-ENTMCNC: 28.7 PG — SIGNIFICANT CHANGE UP (ref 27–31)
MCHC RBC-ENTMCNC: 33.4 G/DL — SIGNIFICANT CHANGE UP (ref 32–37)
MCV RBC AUTO: 85.8 FL — SIGNIFICANT CHANGE UP (ref 81–99)
MONOCYTES # BLD AUTO: 0.68 K/UL — HIGH (ref 0.1–0.6)
MONOCYTES NFR BLD AUTO: 9.5 % — HIGH (ref 1.7–9.3)
NEUTROPHILS # BLD AUTO: 5 K/UL — SIGNIFICANT CHANGE UP (ref 1.4–6.5)
NEUTROPHILS NFR BLD AUTO: 70.2 % — SIGNIFICANT CHANGE UP (ref 42.2–75.2)
NITRITE UR-MCNC: NEGATIVE — SIGNIFICANT CHANGE UP
NRBC # BLD: 0 /100 WBCS — SIGNIFICANT CHANGE UP (ref 0–0)
NT-PROBNP SERPL-SCNC: 47 PG/ML — SIGNIFICANT CHANGE UP (ref 0–300)
PH UR: 6 — SIGNIFICANT CHANGE UP (ref 5–8)
PLATELET # BLD AUTO: 180 K/UL — SIGNIFICANT CHANGE UP (ref 130–400)
PMV BLD: 10.4 FL — SIGNIFICANT CHANGE UP (ref 7.4–10.4)
POTASSIUM SERPL-MCNC: 4.2 MMOL/L — SIGNIFICANT CHANGE UP (ref 3.5–5)
POTASSIUM SERPL-SCNC: 4.2 MMOL/L — SIGNIFICANT CHANGE UP (ref 3.5–5)
PROT SERPL-MCNC: 6.8 G/DL — SIGNIFICANT CHANGE UP (ref 6–8)
PROT UR-MCNC: NEGATIVE MG/DL — SIGNIFICANT CHANGE UP
RBC # BLD: 4.22 M/UL — SIGNIFICANT CHANGE UP (ref 4.2–5.4)
RBC # FLD: 13.8 % — SIGNIFICANT CHANGE UP (ref 11.5–14.5)
RSV RNA NPH QL NAA+NON-PROBE: SIGNIFICANT CHANGE UP
SARS-COV-2 RNA SPEC QL NAA+PROBE: SIGNIFICANT CHANGE UP
SODIUM SERPL-SCNC: 140 MMOL/L — SIGNIFICANT CHANGE UP (ref 135–146)
SP GR SPEC: 1.02 — SIGNIFICANT CHANGE UP (ref 1–1.03)
TROPONIN T, HIGH SENSITIVITY RESULT: <6 NG/L — SIGNIFICANT CHANGE UP (ref 6–13)
UROBILINOGEN FLD QL: 0.2 MG/DL — SIGNIFICANT CHANGE UP (ref 0.2–1)
WBC # BLD: 7.13 K/UL — SIGNIFICANT CHANGE UP (ref 4.8–10.8)
WBC # FLD AUTO: 7.13 K/UL — SIGNIFICANT CHANGE UP (ref 4.8–10.8)

## 2024-05-04 PROCEDURE — 93010 ELECTROCARDIOGRAM REPORT: CPT

## 2024-05-04 PROCEDURE — 99285 EMERGENCY DEPT VISIT HI MDM: CPT | Mod: 25

## 2024-05-04 PROCEDURE — 81003 URINALYSIS AUTO W/O SCOPE: CPT

## 2024-05-04 PROCEDURE — 36415 COLL VENOUS BLD VENIPUNCTURE: CPT

## 2024-05-04 PROCEDURE — 71046 X-RAY EXAM CHEST 2 VIEWS: CPT

## 2024-05-04 PROCEDURE — 71046 X-RAY EXAM CHEST 2 VIEWS: CPT | Mod: 26

## 2024-05-04 PROCEDURE — 99285 EMERGENCY DEPT VISIT HI MDM: CPT

## 2024-05-04 PROCEDURE — 84484 ASSAY OF TROPONIN QUANT: CPT

## 2024-05-04 PROCEDURE — 85025 COMPLETE CBC W/AUTO DIFF WBC: CPT

## 2024-05-04 PROCEDURE — 83735 ASSAY OF MAGNESIUM: CPT

## 2024-05-04 PROCEDURE — 83880 ASSAY OF NATRIURETIC PEPTIDE: CPT

## 2024-05-04 PROCEDURE — 80053 COMPREHEN METABOLIC PANEL: CPT

## 2024-05-04 PROCEDURE — 0241U: CPT

## 2024-05-04 PROCEDURE — 36000 PLACE NEEDLE IN VEIN: CPT

## 2024-05-04 PROCEDURE — 93005 ELECTROCARDIOGRAM TRACING: CPT

## 2024-05-04 RX ORDER — SODIUM CHLORIDE 9 MG/ML
1000 INJECTION, SOLUTION INTRAVENOUS ONCE
Refills: 0 | Status: COMPLETED | OUTPATIENT
Start: 2024-05-04 | End: 2024-05-04

## 2024-05-04 RX ORDER — ACETAMINOPHEN 500 MG
975 TABLET ORAL ONCE
Refills: 0 | Status: COMPLETED | OUTPATIENT
Start: 2024-05-04 | End: 2024-05-04

## 2024-05-04 RX ADMIN — Medication 975 MILLIGRAM(S): at 16:26

## 2024-05-04 RX ADMIN — SODIUM CHLORIDE 1000 MILLILITER(S): 9 INJECTION, SOLUTION INTRAVENOUS at 16:26

## 2024-05-04 RX ADMIN — Medication 975 MILLIGRAM(S): at 16:25

## 2024-05-04 NOTE — ED PROVIDER NOTE - NSFOLLOWUPINSTRUCTIONS_ED_ALL_ED_FT
Our Emergency Department Referral Coordinators will be reaching out to you in the next 24-48 hours from 9:00am to 5:00pm to schedule a follow up appointment. Please expect a phone call from the hospital in that time frame. If you do not receive a call or if you have any questions or concerns, you can reach them at   [phone] [place] [address].    Palpitations    A palpitation is the feeling that your heartbeat is irregular or is faster than normal. It may feel like your heart is fluttering or skipping a beat. They may be caused by many things, including smoking, caffeine, alcohol, stress, and certain medicines. Although most causes of palpitations are not serious, palpitations can be a sign of a serious medical problem. Avoid caffeine, alcohol, tobacco products at home. Try to reduce stress and anxiety, and make sure to get plenty of rest.     SEEK IMMEDIATE MEDICAL CARE IF YOU HAVE THE FOLLOWING SYMPTOMS: chest pain, shortness of breath, severe headache, or dizziness/lightheadedness.

## 2024-05-04 NOTE — ED PROVIDER NOTE - PROVIDER TOKENS
FREE:[LAST:[your PMD],PHONE:[(   )    -],FAX:[(   )    -],FOLLOWUP:[1-3 Days]],PROVIDER:[TOKEN:[[id number]],FOLLOWUP:[1-3 Days]]

## 2024-05-04 NOTE — ED PROVIDER NOTE - CARE PROVIDER_API CALL
your PMD,   Phone: (   )    -  Fax: (   )    -  Follow Up Time: 1-3 Days    Derrick George  Cardiovascular Disease  [address] [place] [id number]  Phone: [phone]  Fax: [phone]  Follow Up Time: 1-3 Days

## 2024-05-04 NOTE — ED PROVIDER NOTE - PATIENT PORTAL LINK FT
You can access the FollowMyHealth Patient Portal offered by [facility] by registering at the following website: [email or web address]. By joining [facility]’s FollowMyHealth portal, you will also be able to view your health information using other applications (apps) compatible with our system.

## 2024-05-04 NOTE — ED PROVIDER NOTE - OBJECTIVE STATEMENT
29-year-old female history of palpitations complaining of dizziness, weakness, palpitations, body aches, nausea and chest heaviness since yesterday.  Temperature 99.   Patient denies any URI symptoms.  Patient states that she is on an elimination diet due to her breast fed baby being milk protein allergic.  She is 6 months postpartum.  She states while she was she was pregnant she had palpitations and saw a cardiologist at that time.

## 2024-05-04 NOTE — ED PROVIDER NOTE - CLINICAL SUMMARY MEDICAL DECISION MAKING FREE TEXT BOX
28 yo female, PMHx of sinus tachycardia, 6 months post partum, presenting for feeling generally unwell starting yesterday, worse today associated with bodyaches, chills, and intermittent chest palpitations, and shortness of breath on exertion. Denies fevers, vomiting, abdominal pain. Well appearing on exam. Heart RRR. Lungs CTAB. Abdomen soft NDNT. No leg swelling or tenderness. PERC negative. She also states she has been on a very restrictive diet because she breast feeds her child who has allergies and is not eating enough.  Patient follows with a cardiologist since her pregnancy because she was having palpitations and her work up only revealed intermittent tachycardia. Vitals reviewed. HR 96 T 99.1. Labs and EKG were ordered and reviewed.  Imaging was ordered and reviewed by me.  Appropriate medications for patient's presenting complaints were ordered and effects were reassessed. 30 yo female, PMHx of sinus tachycardia, 6 months post partum, presenting for feeling generally unwell starting yesterday, worse today associated with bodyaches, chills, and intermittent chest palpitations. Denies fevers, vomiting, abdominal pain. Well appearing on exam. Heart RRR. Lungs CTAB. Abdomen soft NDNT. No leg swelling or tenderness. PERC negative. She also states she has been on a very restrictive diet because she breast feeds her child who has allergies and is not eating enough.  Patient follows with a cardiologist since her pregnancy because she was having palpitations and her work up only revealed intermittent tachycardia. Vitals reviewed. HR 96 T 99.1. Labs and EKG were ordered and reviewed.  Imaging was ordered and reviewed by me.  Appropriate medications for patient's presenting complaints were ordered and effects were reassessed. 28 yo female, PMHx of sinus tachycardia, 6 months post partum, presenting for feeling generally unwell starting yesterday, worse today associated with bodyaches, chills, and intermittent chest palpitations. Denies fevers, vomiting, abdominal pain. Well appearing on exam. Heart RRR. Lungs CTAB. Abdomen soft NDNT. No leg swelling or tenderness. PERC negative. She also states she has been on a very restrictive diet because she breast feeds her child who has allergies and is not eating enough.  Patient follows with a cardiologist since her pregnancy because she was having palpitations and her work up only revealed intermittent tachycardia. Vitals reviewed. HR 96 T 99.1. Labs and EKG were ordered and reviewed.  Imaging was ordered and reviewed by me.  Appropriate medications for patient's presenting complaints were ordered and effects were reassessed. discussed all results with patient. given return precautions and follow up outpatient. Patient comfortable with plan. Has established cardiology follow up in [place].

## 2024-05-04 NOTE — ED PROVIDER NOTE - CARE PROVIDERS DIRECT ADDRESSES
,DirectAddress_Unknown,[email or web address]

## 2024-05-15 ENCOUNTER — APPOINTMENT (OUTPATIENT)
Dept: OBGYN | Facility: CLINIC | Age: 29
End: 2024-05-15
Payer: COMMERCIAL

## 2024-05-15 ENCOUNTER — APPOINTMENT (OUTPATIENT)
Dept: CARDIOLOGY | Facility: CLINIC | Age: 29
End: 2024-05-15

## 2024-05-15 VITALS
HEIGHT: 60 IN | SYSTOLIC BLOOD PRESSURE: 102 MMHG | BODY MASS INDEX: 24.35 KG/M2 | DIASTOLIC BLOOD PRESSURE: 71 MMHG | WEIGHT: 124 LBS

## 2024-05-15 DIAGNOSIS — Z11.51 ENCOUNTER FOR SCREENING FOR HUMAN PAPILLOMAVIRUS (HPV): ICD-10-CM

## 2024-05-15 DIAGNOSIS — Z12.39 ENCOUNTER FOR OTHER SCREENING FOR MALIGNANT NEOPLASM OF BREAST: ICD-10-CM

## 2024-05-15 DIAGNOSIS — N94.10 UNSPECIFIED DYSPAREUNIA: ICD-10-CM

## 2024-05-15 DIAGNOSIS — Z12.4 ENCOUNTER FOR SCREENING FOR MALIGNANT NEOPLASM OF CERVIX: ICD-10-CM

## 2024-05-15 PROCEDURE — 99395 PREV VISIT EST AGE 18-39: CPT

## 2024-05-15 NOTE — PLAN
[FreeTextEntry1] : Routine GYN Exam -Discussed and reviewed importance of monthly BSE -Declines STI testing, importance safe sexual practices discussed -Pap test collected and sent at todays visit -declines contraception at this time, discussed ocps-pt will call if she wants to start. advised 1 year healing from c/s prior to next pregnancy.  -f/u PCP for recommended HCM, vaccinations and CA screening -rx for PFPT placed  rto 1 yr or sooner as needed.

## 2024-05-15 NOTE — HISTORY OF PRESENT ILLNESS
[FreeTextEntry1] : 30 y/o P1 , no menses since prior to delivery presents for annual GYN visit. c/o postpartum haor loss and discomfort with intercourse since . Currently nursing w/o difficulty. Not using contraception; reports negative pregnancy test.    23 Dr. Marcelo 39+ weeks, [facility] [Patient refuses STI testing] : Patient refuses STI testing

## 2024-05-15 NOTE — DISCUSSION/SUMMARY
[FreeTextEntry1] : Ms. Sharma is 28 year old woman  that [facility] cardiovascular [facility] in the setting of her current pregnancy with complaints of intermittent palpitations.   She carries a istory of symptomatic palpitations in the past and has been diagnosed with SVT. She reports that [facility] with compaint sof palpitaitons. She underwent an echo with reported normal LV function.  +Family history of hypertension and maternal issues with SVT on BB.  Patient is now 23 weeks pregnant. DUE DATE:    November 14, 2023 LMP:              February 7, 2023  She has been evaluated in the past by a Dr. Taty ESTEVEZ and wore a hotler for one month in which she claims SVT arrhythmia was identifed.   Her blood vital signs were reviewed on [facility] and all blood pressures were normotensive.   She denies any issues currently with chest pain or sortnes of breath.   interval note 9/7/23  has been having ongoing palpitations with episodes of dizziness was seen in the hospital for IVF at times tries to stay on top of keeping herself well hydrated has had an echo and holter in the past  #Intermittent palpitatations.   Lionel has undergone extensive monitoring of her palpitaitons recent echo and holter reviewed with pt and her mom no further work up needed at this time  Advised aggressive hydration  - Encouraged patient to participate in  healthy walking  and eating habits, focusing on a Mediterranean style of eating.  - Encouraged the patient to find healthy outlets and coping mechanisms to help manage stress, such as reducing workload if possible, spending time with family and friends, engaging in an enjoyable hobby, or using meditation or mindfulness techniques.

## 2024-05-15 NOTE — REVIEW OF SYSTEMS
[Patient Intake Form Reviewed] : Patient intake form was reviewed [Negative] : Heme/Lymph [FreeTextEntry8] : painful intercourse [de-identified] : hair loss

## 2024-05-20 LAB — CYTOLOGY CVX/VAG DOC THIN PREP: NORMAL

## 2024-05-25 DIAGNOSIS — N61.0 MASTITIS WITHOUT ABSCESS: ICD-10-CM

## 2024-05-25 RX ORDER — DICLOXACILLIN SODIUM 500 MG/1
500 CAPSULE ORAL 4 TIMES DAILY
Qty: 40 | Refills: 0 | Status: ACTIVE | COMMUNITY
Start: 2024-05-25 | End: 1900-01-01

## 2024-08-06 ENCOUNTER — NON-APPOINTMENT (OUTPATIENT)
Age: 29
End: 2024-08-06

## 2024-08-06 ENCOUNTER — APPOINTMENT (OUTPATIENT)
Dept: OBGYN | Facility: CLINIC | Age: 29
End: 2024-08-06

## 2024-11-08 DIAGNOSIS — B37.9 CANDIDIASIS, UNSPECIFIED: ICD-10-CM

## 2024-11-08 RX ORDER — FLUCONAZOLE 150 MG/1
150 TABLET ORAL
Qty: 2 | Refills: 0 | Status: ACTIVE | COMMUNITY
Start: 2024-11-08 | End: 1900-01-01

## 2025-03-31 NOTE — ED PROVIDER NOTE - OBJECTIVE STATEMENT
Walk in Patient is a 27-year-old female with no past medical history here for evaluation of multiple bouts of loose stool daily over the past 4 days with no aggravating or alleviating factors.  Patient admits to associated lower abdominal discomfort that is been constant and described as achy.  Patient denies fever, chills, chest pain, shortness of breath, recent antibiotic use.

## 2025-06-23 ENCOUNTER — APPOINTMENT (OUTPATIENT)
Dept: OBGYN | Facility: CLINIC | Age: 30
End: 2025-06-23

## (undated) DEVICE — DRSG DERMABOND 0.7ML